# Patient Record
Sex: FEMALE | Race: WHITE | Employment: FULL TIME | URBAN - METROPOLITAN AREA
[De-identification: names, ages, dates, MRNs, and addresses within clinical notes are randomized per-mention and may not be internally consistent; named-entity substitution may affect disease eponyms.]

---

## 2017-01-19 ENCOUNTER — MYC MEDICAL ADVICE (OUTPATIENT)
Dept: FAMILY MEDICINE | Facility: CLINIC | Age: 27
End: 2017-01-19

## 2017-01-19 DIAGNOSIS — F41.8 SITUATIONAL ANXIETY: Primary | ICD-10-CM

## 2017-01-19 RX ORDER — HYDROXYZINE HYDROCHLORIDE 25 MG/1
12.5-25 TABLET, FILM COATED ORAL EVERY 6 HOURS PRN
Qty: 10 TABLET | Refills: 1 | Status: SHIPPED | OUTPATIENT
Start: 2017-01-19 | End: 2017-10-26

## 2017-01-19 NOTE — TELEPHONE ENCOUNTER
RN -- Please let pt know that I am sending hydroxyzine, which is a sedative that is used for anxiety. It is very sedating (all of the prn anxiety medications are). Often, we use ativan (lorazepam), however because that is a controlled substance, we do not generally prescribe this outside of a clinic visit. If in the future, if she has difficulty with flight anxiety and would like to discuss a different option, she can schedule a clinic visit. She should not drive after taking the hydroxyzine. She can take it 15-30 minutes before the flight. If she has not taken this before, I would recommend trying a half tablet first in case it makes her very sleepy. As an alternative, I would recommend loading a meditation maurice on her phone and consider using that with headphones before or at the beginning of the flight to help with some relaxation. Kim Erazo M.D.

## 2017-01-19 NOTE — TELEPHONE ENCOUNTER
Writer called patient and reviewed message per below from Dr. Erazo.    Patient verbalized understanding and in agreement with plan.  NIKKI BetancurN, RN

## 2017-01-19 NOTE — TELEPHONE ENCOUNTER
PAtient also called  States she is flying to [place] tomorrow and is generally OK, but will be flying alone this time and is asking if she can get something or some suggestions to help with flight anxiety for flights there and back.  Mercy Jackson RN

## 2017-01-19 NOTE — TELEPHONE ENCOUNTER
Routing to PCP.    Dr. Erazo-Please review.  Would Evisit be okay? Patient leaves tomorrow.    Thank you!  NIKKI BetancurN, RN

## 2017-10-20 DIAGNOSIS — Z30.9 ENCOUNTER FOR CONTRACEPTIVE MANAGEMENT: ICD-10-CM

## 2017-10-23 RX ORDER — NORETHINDRONE ACETATE AND ETHINYL ESTRADIOL 1.5; 3 MG/1; UG/1
TABLET ORAL
Qty: 28 TABLET | Refills: 0 | Status: SHIPPED | OUTPATIENT
Start: 2017-10-23 | End: 2017-10-26

## 2017-10-23 NOTE — TELEPHONE ENCOUNTER
Patient needs updated annual physical. Patient has scheduled physical on 10/26 at 3:40. Will fill medication for one month in the interim.    Thanks! Genevieve Loredo RN

## 2017-10-26 ENCOUNTER — OFFICE VISIT (OUTPATIENT)
Dept: FAMILY MEDICINE | Facility: CLINIC | Age: 27
End: 2017-10-26
Payer: COMMERCIAL

## 2017-10-26 VITALS
HEIGHT: 65 IN | BODY MASS INDEX: 28.32 KG/M2 | SYSTOLIC BLOOD PRESSURE: 123 MMHG | WEIGHT: 170 LBS | RESPIRATION RATE: 10 BRPM | TEMPERATURE: 97.9 F | HEART RATE: 57 BPM | DIASTOLIC BLOOD PRESSURE: 67 MMHG | OXYGEN SATURATION: 100 %

## 2017-10-26 DIAGNOSIS — F41.8 SITUATIONAL ANXIETY: ICD-10-CM

## 2017-10-26 DIAGNOSIS — Z00.00 ROUTINE GENERAL MEDICAL EXAMINATION AT A HEALTH CARE FACILITY: Primary | ICD-10-CM

## 2017-10-26 DIAGNOSIS — Z30.9 ENCOUNTER FOR CONTRACEPTIVE MANAGEMENT, UNSPECIFIED TYPE: ICD-10-CM

## 2017-10-26 PROCEDURE — 99395 PREV VISIT EST AGE 18-39: CPT | Performed by: FAMILY MEDICINE

## 2017-10-26 RX ORDER — HYDROXYZINE HYDROCHLORIDE 25 MG/1
12.5-25 TABLET, FILM COATED ORAL EVERY 6 HOURS PRN
Qty: 30 TABLET | Refills: 1 | Status: SHIPPED | OUTPATIENT
Start: 2017-10-26 | End: 2019-03-12

## 2017-10-26 RX ORDER — NORETHINDRONE ACETATE AND ETHINYL ESTRADIOL .03; 1.5 MG/1; MG/1
1 TABLET ORAL DAILY
Qty: 84 TABLET | Refills: 3 | Status: SHIPPED | OUTPATIENT
Start: 2017-10-26 | End: 2018-10-29

## 2017-10-26 NOTE — NURSING NOTE
"Chief Complaint   Patient presents with     Physical       Initial /67  Pulse 57  Temp 97.9  F (36.6  C) (Oral)  Resp 10  Ht 5' 5\" (1.651 m)  Wt 170 lb (77.1 kg)  LMP 08/22/2017 (Approximate)  SpO2 100%  BMI 28.29 kg/m2 Estimated body mass index is 28.29 kg/(m^2) as calculated from the following:    Height as of this encounter: 5' 5\" (1.651 m).    Weight as of this encounter: 170 lb (77.1 kg).  Medication Reconciliation: complete     Gaviota Lucas MA     "

## 2017-10-26 NOTE — MR AVS SNAPSHOT
After Visit Summary   10/26/2017    Jose Ramon Santizo    MRN: [id number]           Patient Information     Date Of Birth          1990        Visit Information        Provider Department      10/26/2017 3:40 PM Kmi Erazo MD [facility]        Today's Diagnoses     Routine general medical examination at a health care facility    -  1    Encounter for contraceptive management, unspecified type        Situational anxiety          Care Instructions    Send us the results of your biometric screening.    Preventive Health Recommendations  Female Ages 26 - 39  Yearly exam:   See your health care provider every year in order to    Review health changes.     Discuss preventive care.      Review your medicines if you your doctor has prescribed any.    Until age 30: Get a Pap test every three years (more often if you have had an abnormal result).    After age 30: Talk to your doctor about whether you should have a Pap test every 3 years or have a Pap test with HPV screening every 5 years.   You do not need a Pap test if your uterus was removed (hysterectomy) and you have not had cancer.  You should be tested each year for STDs (sexually transmitted diseases), if you're at risk.   Talk to your provider about how often to have your cholesterol checked.  If you are at risk for diabetes, you should have a diabetes test (fasting glucose).  Shots: Get a flu shot each year. Get a tetanus shot every 10 years.   Nutrition:     Eat at least 5 servings of fruits and vegetables each day.    Eat whole-grain bread, whole-wheat pasta and brown rice instead of white grains and rice.    Talk to your provider about Calcium and Vitamin D.     Lifestyle    Exercise at least 150 minutes a week (30 minutes a day, 5 days of the week). This will help you control your weight and prevent disease.    Limit alcohol to one drink per day.    No smoking.     Wear sunscreen to prevent skin cancer.    See your dentist  "every six months for an exam and cleaning.            Follow-ups after your visit        Who to contact     If you have questions or need follow up information about today's clinic visit or your schedule please contact [facility] directly at [phone].  Normal or non-critical lab and imaging results will be communicated to you by uTrail mehart, letter or phone within 4 business days after the clinic has received the results. If you do not hear from us within 7 days, please contact the clinic through uTrail mehart or phone. If you have a critical or abnormal lab result, we will notify you by phone as soon as possible.  Submit refill requests through DuraFizz or call your pharmacy and they will forward the refill request to us. Please allow 3 business days for your refill to be completed.          Additional Information About Your Visit        uTrail me[employer] Information     DuraFizz gives you secure access to your electronic health record. If you see a primary care provider, you can also send messages to your care team and make appointments. If you have questions, please call your primary care clinic.  If you do not have a primary care provider, please call [phone] and they will assist you.        Care EveryWhere ID     This is your Care EveryWhere ID. This could be used by other organizations to access your [place] medical records  [id number]        Your Vitals Were     Pulse Temperature Respirations Height Last Period Pulse Oximetry    57 97.9  F (36.6  C) (Oral) 10 5' 5\" (1.651 m) 08/22/2017 (Approximate) 100%    BMI (Body Mass Index)                   28.29 kg/m2            Blood Pressure from Last 3 Encounters:   10/26/17 123/67   10/17/16 102/60   07/09/15 126/70    Weight from Last 3 Encounters:   10/26/17 170 lb (77.1 kg)   10/17/16 163 lb (73.9 kg)   07/09/15 172 lb (78 kg)              Today, you had the following     No orders found for display         Today's Medication Changes          These changes " are accurate as of: 10/26/17  4:12 PM.  If you have any questions, ask your nurse or doctor.               These medicines have changed or have updated prescriptions.        Dose/Directions    norethindrone-ethinyl estradiol 1.5-30 MG-MCG per tablet   Commonly known as:  MICROGESTIN   This may have changed:  See the new instructions.   Used for:  Encounter for contraceptive management, unspecified type   Changed by:  Kim Erazo MD        Dose:  1 tablet   Take 1 tablet by mouth daily   Quantity:  84 tablet   Refills:  3            Where to get your medicines      These medications were sent to [id number] - [place], [place] - [address] AT [facility] [address]  [address], [place] [address]     Phone:  [phone]     hydrOXYzine 25 MG tablet    norethindrone-ethinyl estradiol 1.5-30 MG-MCG per tablet                Primary Care Provider Office Phone # Fax #    Kim Erazo [phone] [address] [facility] [address]        Equal Access to Services     [facility]: Hadii gregorio chua hadasho Soomaali, waaxda luqadaha, qaybta kaalmada adeegyajames, bairon lucas . So [facility] [phone].    ATENCIÓN: Si habla español, tiene a eckert disposición servicios gratuitos de asistencia lingüística. [facility] [phone].    We comply with applicable federal civil rights laws and [place] laws. We do not discriminate on the basis of race, color, national origin, age, disability, sex, sexual orientation, or gender identity.            Thank you!     Thank you for choosing [facility]  for your care. Our goal is always to provide you with excellent care. Hearing back from our patients is one way we can continue to improve our services. Please take a few minutes to complete the written survey that you may receive in the mail after your visit with us. Thank you!             Your Updated Medication List - Protect others around you: Learn  how to safely use, store and throw away your medicines at www.disposemymeds.org.          This list is accurate as of: 10/26/17  4:12 PM.  Always use your most recent med list.                   Brand Name Dispense Instructions for use Diagnosis    hydrOXYzine 25 MG tablet    ATARAX    30 tablet    Take 0.5-1 tablets (12.5-25 mg) by mouth every 6 hours as needed for itching or anxiety    Situational anxiety       norethindrone-ethinyl estradiol 1.5-30 MG-MCG per tablet    MICROGESTIN    84 tablet    Take 1 tablet by mouth daily    Encounter for contraceptive management, unspecified type

## 2017-10-26 NOTE — PATIENT INSTRUCTIONS
Send us the results of your biometric screening.    Preventive Health Recommendations  Female Ages 26 - 39  Yearly exam:   See your health care provider every year in order to    Review health changes.     Discuss preventive care.      Review your medicines if you your doctor has prescribed any.    Until age 30: Get a Pap test every three years (more often if you have had an abnormal result).    After age 30: Talk to your doctor about whether you should have a Pap test every 3 years or have a Pap test with HPV screening every 5 years.   You do not need a Pap test if your uterus was removed (hysterectomy) and you have not had cancer.  You should be tested each year for STDs (sexually transmitted diseases), if you're at risk.   Talk to your provider about how often to have your cholesterol checked.  If you are at risk for diabetes, you should have a diabetes test (fasting glucose).  Shots: Get a flu shot each year. Get a tetanus shot every 10 years.   Nutrition:     Eat at least 5 servings of fruits and vegetables each day.    Eat whole-grain bread, whole-wheat pasta and brown rice instead of white grains and rice.    Talk to your provider about Calcium and Vitamin D.     Lifestyle    Exercise at least 150 minutes a week (30 minutes a day, 5 days of the week). This will help you control your weight and prevent disease.    Limit alcohol to one drink per day.    No smoking.     Wear sunscreen to prevent skin cancer.    See your dentist every six months for an exam and cleaning.

## 2017-10-26 NOTE — PROGRESS NOTES
SUBJECTIVE:   CC: Jose Ramon Santizo is an 27 year old woman who presents for preventive health visit.     Physical   Annual:     Getting at least 3 servings of Calcium per day::  Yes    Bi-annual eye exam::  Yes    Dental care twice a year::  Yes    Sleep apnea or symptoms of sleep apnea::  None    Diet::  Regular (no restrictions)    Frequency of exercise::  4-5 days/week    Duration of exercise::  45-60 minutes    Taking medications regularly::  Yes    Medication side effects::  Not applicable    Additional concerns today::  No    Answers for HPI/ROS submitted by the patient on 10/23/2017   PHQ-2 Score: 0      She is tolerating microgestin and it is working well for her.    She would like a refill on her Atarax. She is using it infrequently in stressful situations.     Patient scheduled an appointment with cardiology but the clinic had to cancel as the cardiologist was out of town. She never rescheduled as she was not  concerned. She has not had another episode. She would like to continue to monitor. Occasionally she gets palpitations that last a few seconds and resolve with no accompanying symptoms.      She plans to get her flu shot at work.  She just had health screening done in August through the [employer].      Today's PHQ-2 Score:   PHQ-2 ( 1999 Pfizer) 10/23/2017   Q1: Little interest or pleasure in doing things 0   Q2: Feeling down, depressed or hopeless 0   PHQ-2 Score 0   Q1: Little interest or pleasure in doing things Not at all   Q2: Feeling down, depressed or hopeless Not at all   PHQ-2 Score 0     Abuse: Current or Past(Physical, Sexual or Emotional)- No  Do you feel safe in your environment - Yes    Social History   Substance Use Topics     Smoking status: Former Smoker     Packs/day: 0.50     Quit date: 9/26/2014     Smokeless tobacco: Never Used     Alcohol use Yes      Comment: 6 drinks a week      The patient does not drink >3 drinks per day nor >7 drinks per week.    Reviewed orders with patient.   Reviewed health maintenance and updated orders accordingly - Yes  BP Readings from Last 3 Encounters:   10/26/17 123/67   10/17/16 102/60   07/09/15 126/70    Wt Readings from Last 3 Encounters:   10/26/17 77.1 kg (170 lb)   10/17/16 73.9 kg (163 lb)   07/09/15 78 kg (172 lb)         Patient Active Problem List   Diagnosis     History of anesthesia reaction     CARDIOVASCULAR SCREENING; LDL GOAL LESS THAN 160     Past Surgical History:   Procedure Laterality Date     ENT SURGERY       ORTHOPEDIC SURGERY  3/2005    ACL repair       Social History   Substance Use Topics     Smoking status: Former Smoker     Packs/day: 0.50     Quit date: 9/26/2014     Smokeless tobacco: Never Used     Alcohol use Yes      Comment: 6 drinks a week      Family History   Problem Relation Age of Onset     Anesthesia Reaction Mother      Alcohol/Drug Other      Breast Cancer Other      CANCER Other      DIABETES Other      Thyroid Disease Other          Current Outpatient Prescriptions   Medication Sig Dispense Refill     norethindrone-ethinyl estradiol (MICROGESTIN) 1.5-30 MG-MCG per tablet Take 1 tablet by mouth daily 84 tablet 3     hydrOXYzine (ATARAX) 25 MG tablet Take 0.5-1 tablets (12.5-25 mg) by mouth every 6 hours as needed for itching or anxiety 30 tablet 1     [DISCONTINUED] MICROGESTIN 1.5-30 MG-MCG per tablet TAKE 1 TABLET BY MOUTH DAILY 28 tablet 0     Allergies   Allergen Reactions     Ciprofloxacin      Recent Labs   Lab Test  07/03/14   1238 07/02/13   LDL  98   --    HDL  60   --    TRIG  138   --    TSH   --   0.74      Mammogram not appropriate for this patient based on age.  Pertinent mammograms are reviewed under the imaging tab.  History of abnormal Pap smear:   NO - age 30- 65 PAP every 3 years recommended  Last 3 Pap Results:   PAP (no units)   Date Value   10/17/2016 NIL     Reviewed and updated as needed this visit by clinical staffTobacco  Allergies  Meds  Med Hx  Surg Hx  Fam Hx  Soc Hx      Reviewed and  "updated as needed this visit by Provider        Past Medical History:   Diagnosis Date     History of anesthesia reaction       Past Surgical History:   Procedure Laterality Date     ENT SURGERY       ORTHOPEDIC SURGERY  3/2005    ACL repair     Obstetric History       T0      L0     SAB0   TAB0   Ectopic0   Multiple0   Live Births0           Review of Systems   ROS: 10 point ROS neg other than the symptoms noted above in the HPI.    This document serves as a record of the services and decisions personally performed and made by Kim Erazo MD. It was created on his/her behalf by Katarina Ross, trained medical scribe. The creation of this document is based the provider's statements to the medical scribes.    Scribe Katarina Ross, 2017   OBJECTIVE:   /67  Pulse 57  Temp 97.9  F (36.6  C) (Oral)  Resp 10  Ht 1.651 m (5' 5\")  Wt 77.1 kg (170 lb)  LMP 2017 (Approximate)  SpO2 100%  BMI 28.29 kg/m2  Physical Exam  GENERAL: healthy, alert and no distress  EYES: Eyes grossly normal to inspection, PERRL and conjunctivae and sclerae normal  HENT: ear canals and TM's normal, nose and mouth without ulcers or lesions  NECK: no adenopathy, no asymmetry, masses, or scars and thyroid normal to palpation  RESP: lungs clear to auscultation - no rales, rhonchi or wheezes  CV: regular rate and rhythm, normal S1 S2, no S3 or S4, no murmur, click or rub, no peripheral edema and peripheral pulses strong  ABDOMEN: soft, nontender, no hepatosplenomegaly, no masses and bowel sounds normal  MS: no gross musculoskeletal defects noted, no edema  SKIN: no suspicious lesions or rashes  NEURO: Normal strength and tone, mentation intact and speech normal  PSYCH: mentation appears normal, affect normal/bright  ASSESSMENT/PLAN:   1. Routine general medical examination at a health care facility  PAP due . She had biometric screening done through work in August with lipids and glucose. Health " "maintenance utd    2. Encounter for contraceptive management, unspecified type    - norethindrone-ethinyl estradiol (MICROGESTIN) 1.5-30 MG-MCG per tablet; Take 1 tablet by mouth daily  Dispense: 84 tablet; Refill: 3    3. Situational anxiety  Stable. She continues to use Atarax infrequently as needed.  - hydrOXYzine (ATARAX) 25 MG tablet; Take 0.5-1 tablets (12.5-25 mg) by mouth every 6 hours as needed for itching or anxiety  Dispense: 30 tablet; Refill: 1      COUNSELING:  Reviewed preventive health counseling, as reflected in patient instructions  BP Screening:   Last 3 BP Readings:    BP Readings from Last 3 Encounters:   10/26/17 123/67   10/17/16 102/60   07/09/15 126/70   The following was recommended to the patient:  Re-screen BP within a year and recommended lifestyle modifications   reports that she quit smoking about 3 years ago. She smoked 0.50 packs per day. She has never used smokeless tobacco.  Estimated body mass index is 28.29 kg/(m^2) as calculated from the following:    Height as of this encounter: 1.651 m (5' 5\").    Weight as of this encounter: 77.1 kg (170 lb).   Weight management plan: Discussed healthy diet and exercise guidelines and patient will follow up in 12 months in clinic to re-evaluate.    Counseling Resources:  ATP IV Guidelines  Pooled Cohorts Equation Calculator  Breast Cancer Risk Calculator  FRAX Risk Assessment  ICSI Preventive Guidelines  Dietary Guidelines for Americans, 2010  USDA's MyPlate  ASA Prophylaxis  Lung CA Screening    The information in this document, created by the medical scribe for me, accurately reflects the services I personally performed and the decisions made by me. I have reviewed and approved this document for accuracy. 10/26/17    Kim Erazo MD  [facility]      "

## 2017-12-21 ENCOUNTER — MYC MEDICAL ADVICE (OUTPATIENT)
Dept: FAMILY MEDICINE | Facility: CLINIC | Age: 27
End: 2017-12-21

## 2018-10-26 ASSESSMENT — ENCOUNTER SYMPTOMS
JOINT SWELLING: 0
ABDOMINAL PAIN: 0
SORE THROAT: 0
DIZZINESS: 0
CONSTIPATION: 0
WEAKNESS: 0
ARTHRALGIAS: 0
NAUSEA: 0
HEMATOCHEZIA: 0
DYSURIA: 0
SHORTNESS OF BREATH: 0
FREQUENCY: 0
HEARTBURN: 0
HEMATURIA: 0
EYE PAIN: 0
PALPITATIONS: 0
PARESTHESIAS: 0
NERVOUS/ANXIOUS: 0
DIARRHEA: 0
BREAST MASS: 0
FEVER: 0
HEADACHES: 0
CHILLS: 0
MYALGIAS: 0
COUGH: 0

## 2018-10-29 ENCOUNTER — OFFICE VISIT (OUTPATIENT)
Dept: FAMILY MEDICINE | Facility: CLINIC | Age: 28
End: 2018-10-29
Payer: COMMERCIAL

## 2018-10-29 VITALS
SYSTOLIC BLOOD PRESSURE: 118 MMHG | BODY MASS INDEX: 27.2 KG/M2 | HEART RATE: 58 BPM | DIASTOLIC BLOOD PRESSURE: 68 MMHG | OXYGEN SATURATION: 97 % | WEIGHT: 163.25 LBS | HEIGHT: 65 IN

## 2018-10-29 DIAGNOSIS — Z13.1 SCREENING FOR DIABETES MELLITUS: ICD-10-CM

## 2018-10-29 DIAGNOSIS — L30.9 ECZEMA, UNSPECIFIED TYPE: ICD-10-CM

## 2018-10-29 DIAGNOSIS — Z11.4 SCREENING FOR HUMAN IMMUNODEFICIENCY VIRUS: ICD-10-CM

## 2018-10-29 DIAGNOSIS — Z30.9 ENCOUNTER FOR CONTRACEPTIVE MANAGEMENT, UNSPECIFIED TYPE: ICD-10-CM

## 2018-10-29 DIAGNOSIS — Z00.00 ENCOUNTER FOR ROUTINE ADULT HEALTH EXAMINATION WITHOUT ABNORMAL FINDINGS: Primary | ICD-10-CM

## 2018-10-29 DIAGNOSIS — Z13.6 CARDIOVASCULAR SCREENING; LDL GOAL LESS THAN 160: ICD-10-CM

## 2018-10-29 LAB
CHOLEST SERPL-MCNC: 185 MG/DL
GLUCOSE SERPL-MCNC: 83 MG/DL (ref 70–99)
HDLC SERPL-MCNC: 70 MG/DL
LDLC SERPL CALC-MCNC: 98 MG/DL
NONHDLC SERPL-MCNC: 115 MG/DL
TRIGL SERPL-MCNC: 87 MG/DL

## 2018-10-29 PROCEDURE — 36415 COLL VENOUS BLD VENIPUNCTURE: CPT | Performed by: FAMILY MEDICINE

## 2018-10-29 PROCEDURE — 99395 PREV VISIT EST AGE 18-39: CPT | Performed by: FAMILY MEDICINE

## 2018-10-29 PROCEDURE — 87389 HIV-1 AG W/HIV-1&-2 AB AG IA: CPT | Performed by: FAMILY MEDICINE

## 2018-10-29 PROCEDURE — 82947 ASSAY GLUCOSE BLOOD QUANT: CPT | Performed by: FAMILY MEDICINE

## 2018-10-29 PROCEDURE — 80061 LIPID PANEL: CPT | Performed by: FAMILY MEDICINE

## 2018-10-29 RX ORDER — TRIAMCINOLONE ACETONIDE 1 MG/G
CREAM TOPICAL
Qty: 30 G | Refills: 0 | Status: SHIPPED | OUTPATIENT
Start: 2018-10-29 | End: 2020-02-25

## 2018-10-29 RX ORDER — NORETHINDRONE ACETATE AND ETHINYL ESTRADIOL .03; 1.5 MG/1; MG/1
1 TABLET ORAL DAILY
Qty: 84 TABLET | Refills: 3 | Status: SHIPPED | OUTPATIENT
Start: 2018-10-29 | End: 2018-10-29

## 2018-10-29 RX ORDER — NORETHINDRONE ACETATE AND ETHINYL ESTRADIOL .03; 1.5 MG/1; MG/1
1 TABLET ORAL DAILY
Qty: 84 TABLET | Refills: 3 | Status: SHIPPED | OUTPATIENT
Start: 2018-10-29 | End: 2019-06-17

## 2018-10-29 RX ORDER — TRIAMCINOLONE ACETONIDE 1 MG/G
CREAM TOPICAL
Qty: 30 G | Refills: 0 | Status: SHIPPED | OUTPATIENT
Start: 2018-10-29 | End: 2018-10-29

## 2018-10-29 ASSESSMENT — ENCOUNTER SYMPTOMS
NERVOUS/ANXIOUS: 0
DIARRHEA: 0
ARTHRALGIAS: 0
PALPITATIONS: 0
CONSTIPATION: 0
HEMATURIA: 0
FEVER: 0
NAUSEA: 0
SORE THROAT: 0
ABDOMINAL PAIN: 0
CHILLS: 0
HEARTBURN: 0
BREAST MASS: 0
PARESTHESIAS: 0
WEAKNESS: 0
COUGH: 0
FREQUENCY: 0
HEADACHES: 0
SHORTNESS OF BREATH: 0
DYSURIA: 0
EYE PAIN: 0
DIZZINESS: 0
JOINT SWELLING: 0
MYALGIAS: 0
HEMATOCHEZIA: 0

## 2018-10-29 NOTE — PATIENT INSTRUCTIONS
1) Get your flu shot at work..     Preventive Health Recommendations  Female Ages 26 - 39  Yearly exam:   See your health care provider every year in order to    Review health changes.     Discuss preventive care.      Review your medicines if you your doctor has prescribed any.    Until age 30: Get a Pap test every three years (more often if you have had an abnormal result).    After age 30: Talk to your doctor about whether you should have a Pap test every 3 years or have a Pap test with HPV screening every 5 years.   You do not need a Pap test if your uterus was removed (hysterectomy) and you have not had cancer.  You should be tested each year for STDs (sexually transmitted diseases), if you're at risk.   Talk to your provider about how often to have your cholesterol checked.  If you are at risk for diabetes, you should have a diabetes test (fasting glucose).  Shots: Get a flu shot each year. Get a tetanus shot every 10 years.   Nutrition:     Eat at least 5 servings of fruits and vegetables each day.    Eat whole-grain bread, whole-wheat pasta and brown rice instead of white grains and rice.    Get adequate Calcium and Vitamin D.     Lifestyle    Exercise at least 150 minutes a week (30 minutes a day, 5 days of the week). This will help you control your weight and prevent disease.    Limit alcohol to one drink per day.    No smoking.     Wear sunscreen to prevent skin cancer.    See your dentist every six months for an exam and cleaning.

## 2018-10-29 NOTE — MR AVS SNAPSHOT
After Visit Summary   10/29/2018    Jose Ramon Santizo    MRN: [id number]           Patient Information     Date Of Birth          1990        Visit Information        Provider Department      10/29/2018 10:00 AM Kim Erazo MD [facility]        Today's Diagnoses     Encounter for routine adult health examination without abnormal findings    -  1    Encounter for contraceptive management, unspecified type        Eczema, unspecified type        Screening for human immunodeficiency virus          Care Instructions    1) Get your flu shot at work..     Preventive Health Recommendations  Female Ages 26 - 39  Yearly exam:   See your health care provider every year in order to    Review health changes.     Discuss preventive care.      Review your medicines if you your doctor has prescribed any.    Until age 30: Get a Pap test every three years (more often if you have had an abnormal result).    After age 30: Talk to your doctor about whether you should have a Pap test every 3 years or have a Pap test with HPV screening every 5 years.   You do not need a Pap test if your uterus was removed (hysterectomy) and you have not had cancer.  You should be tested each year for STDs (sexually transmitted diseases), if you're at risk.   Talk to your provider about how often to have your cholesterol checked.  If you are at risk for diabetes, you should have a diabetes test (fasting glucose).  Shots: Get a flu shot each year. Get a tetanus shot every 10 years.   Nutrition:     Eat at least 5 servings of fruits and vegetables each day.    Eat whole-grain bread, whole-wheat pasta and brown rice instead of white grains and rice.    Get adequate Calcium and Vitamin D.     Lifestyle    Exercise at least 150 minutes a week (30 minutes a day, 5 days of the week). This will help you control your weight and prevent disease.    Limit alcohol to one drink per day.    No smoking.     Wear sunscreen to prevent  "skin cancer.    See your dentist every six months for an exam and cleaning.            Follow-ups after your visit        Who to contact     If you have questions or need follow up information about today's clinic visit or your schedule please contact [facility] directly at [phone].  Normal or non-critical lab and imaging results will be communicated to you by MyChart, letter or phone within 4 business days after the clinic has received the results. If you do not hear from us within 7 days, please contact the clinic through [employer] or phone. If you have a critical or abnormal lab result, we will notify you by phone as soon as possible.  Submit refill requests through [employer] or call your pharmacy and they will forward the refill request to us. Please allow 3 business days for your refill to be completed.          Additional Information About Your Visit        MyChart Information     [employer] gives you secure access to your electronic health record. If you see a primary care provider, you can also send messages to your care team and make appointments. If you have questions, please call your primary care clinic.  If you do not have a primary care provider, please call [phone] and they will assist you.        Care EveryWhere ID     This is your Care EveryWhere ID. This could be used by other organizations to access your [place] medical records  [id number]        Your Vitals Were     Pulse Height Last Period Pulse Oximetry BMI (Body Mass Index)       58 5' 5\" (1.651 m) 10/09/2018 (Exact Date) 97% 27.17 kg/m2        Blood Pressure from Last 3 Encounters:   10/29/18 118/68   10/26/17 123/67   10/17/16 102/60    Weight from Last 3 Encounters:   10/29/18 163 lb 4 oz (74 kg)   10/26/17 170 lb (77.1 kg)   10/17/16 163 lb (73.9 kg)              We Performed the Following     HIV Antigen Antibody Combo          Today's Medication Changes          These changes are accurate as of 10/29/18 10:42 AM.  If " you have any questions, ask your nurse or doctor.               Start taking these medicines.        Dose/Directions    norethindrone-ethinyl estradiol 1.5-30 MG-MCG per tablet   Commonly known as:  MICROGESTIN   Used for:  Encounter for contraceptive management, unspecified type   Started by:  Kim Erazo MD        Dose:  1 tablet   Take 1 tablet by mouth daily   Quantity:  84 tablet   Refills:  3       triamcinolone 0.1 % cream   Commonly known as:  KENALOG   Used for:  Eczema, unspecified type   Started by:  Kim Erazo MD        Apply sparingly to affected area three times daily for 14 days.   Quantity:  30 g   Refills:  0            Where to get your medicines      These medications were sent to [address] [address]     Phone:  [phone]     norethindrone-ethinyl estradiol 1.5-30 MG-MCG per tablet    triamcinolone 0.1 % cream                Primary Care Provider Office Phone # Fax #    Kim Erazo [phone]       [address]  [facility] [address]        Equal Access to Services     [facility] : Hadii gregorio knox Soelian, waaxda lusravanthi, qaybta kaalmajames sousa, bairon lucas . So [facility] [phone].    ATENCIÓN: Si habla español, tiene a eckert disposición servicios gratuitos de asistencia lingüística. [facility] [phone].    We comply with applicable federal civil rights laws and [place] laws. We do not discriminate on the basis of race, color, national origin, age, disability, sex, sexual orientation, or gender identity.            Thank you!     Thank you for choosing [facility]  for your care. Our goal is always to provide you with excellent care. Hearing back from our patients is one way we can continue to improve our services. Please take a few minutes to complete the written survey that you may receive in the mail after your visit  with us. Thank you!             Your Updated Medication List - Protect others around you: Learn how to safely use, store and throw away your medicines at www.disposemymeds.org.          This list is accurate as of 10/29/18 10:42 AM.  Always use your most recent med list.                   Brand Name Dispense Instructions for use Diagnosis    hydrOXYzine 25 MG tablet    ATARAX    30 tablet    Take 0.5-1 tablets (12.5-25 mg) by mouth every 6 hours as needed for itching or anxiety    Situational anxiety       norethindrone-ethinyl estradiol 1.5-30 MG-MCG per tablet    MICROGESTIN    84 tablet    Take 1 tablet by mouth daily    Encounter for contraceptive management, unspecified type       triamcinolone 0.1 % cream    KENALOG    30 g    Apply sparingly to affected area three times daily for 14 days.    Eczema, unspecified type

## 2018-10-29 NOTE — PROGRESS NOTES
SUBJECTIVE:   CC: Jose Ramon Santizo is an 28 year old woman who presents for preventive health visit.     Physical   Annual:     Getting at least 3 servings of Calcium per day:  Yes    Bi-annual eye exam:  Yes    Dental care twice a year:  Yes    Sleep apnea or symptoms of sleep apnea:  None    Diet:  Carbohydrate counting and Breakfast skipped    Frequency of exercise:  4-5 days/week    Duration of exercise:  Greater than 60 minutes    Taking medications regularly:  Yes    Medication side effects:  Not applicable    Additional concerns today:  No      Today's PHQ-2 Score:   PHQ-2 ( 1999 [employer]) 10/26/2018   Q1: Little interest or pleasure in doing things 0   Q2: Feeling down, depressed or hopeless 0   PHQ-2 Score 0   Q1: Little interest or pleasure in doing things Not at all   Q2: Feeling down, depressed or hopeless Not at all   PHQ-2 Score 0       Abuse: Current or Past(Physical, Sexual or Emotional)- No  Do you feel safe in your environment - Yes    Social History   Substance Use Topics     Smoking status: Former Smoker     Packs/day: 0.50     Types: Cigarettes     Start date: 1/1/2010     Quit date: 9/26/2014     Smokeless tobacco: Never Used     Alcohol use Yes      Comment: 6 drinks a week      Alcohol Use 10/26/2018   If you drink alcohol do you typically have greater than 3 drinks per day OR greater than 7 drinks per week? No   No flowsheet data found.    Reviewed orders with patient.  Reviewed health maintenance and updated orders accordingly - Yes  BP Readings from Last 3 Encounters:   10/29/18 118/68   10/26/17 123/67   10/17/16 102/60    Wt Readings from Last 3 Encounters:   10/29/18 163 lb 4 oz (74 kg)   10/26/17 170 lb (77.1 kg)   10/17/16 163 lb (73.9 kg)                  Patient Active Problem List   Diagnosis     History of anesthesia reaction     CARDIOVASCULAR SCREENING; LDL GOAL LESS THAN 160     Past Surgical History:   Procedure Laterality Date     ENT SURGERY       EYE SURGERY  January 2017     LASIK     ORTHOPEDIC SURGERY  3/2005    ACL repair       Social History   Substance Use Topics     Smoking status: Former Smoker     Packs/day: 0.50     Types: Cigarettes     Start date: 1/1/2010     Quit date: 9/26/2014     Smokeless tobacco: Never Used     Alcohol use Yes      Comment: 6 drinks a week      Family History   Problem Relation Age of Onset     Anesthesia Reaction Mother      Hypertension Mother      Hyperlipidemia Mother      Anxiety Disorder Mother      Alcohol/Drug Other      Breast Cancer Other      Cancer Other      Diabetes Other      Thyroid Disease Other      Hypertension Maternal Grandmother      Hyperlipidemia Maternal Grandmother      Other Cancer Maternal Grandmother      Ear cancer     Hypertension Maternal Grandfather      Hyperlipidemia Maternal Grandfather      Hypertension Paternal Grandfather      Other Cancer Paternal Grandmother      Ovarian cancer         Current Outpatient Prescriptions   Medication Sig Dispense Refill     hydrOXYzine (ATARAX) 25 MG tablet Take 0.5-1 tablets (12.5-25 mg) by mouth every 6 hours as needed for itching or anxiety 30 tablet 1     norethindrone-ethinyl estradiol (MICROGESTIN) 1.5-30 MG-MCG per tablet Take 1 tablet by mouth daily 84 tablet 3     Allergies   Allergen Reactions     Ciprofloxacin      Recent Labs   Lab Test  07/03/14   1238 07/02/13   LDL  98   --    HDL  60   --    TRIG  138   --    TSH   --   0.74        Mammogram not appropriate for this patient based on age.    Pertinent mammograms are reviewed under the imaging tab.  History of abnormal Pap smear:   NO - age 30- 65 PAP every 3 years recommended  Last 3 Pap Results:   PAP (no units)   Date Value   10/17/2016 NIL     PAP / HPV 10/17/2016   PAP NIL     Reviewed and updated as needed this visit by clinical staff  Tobacco  Allergies  Meds         Reviewed and updated as needed this visit by Provider        Past Medical History:   Diagnosis Date     History of anesthesia reaction      "  Past Surgical History:   Procedure Laterality Date     ENT SURGERY       EYE SURGERY  2017    [facility]     ORTHOPEDIC SURGERY  3/2005    ACL repair     Obstetric History       T0      L0     SAB0   TAB0   Ectopic0   Multiple0   Live Births0           Review of Systems   Constitutional: Negative for chills and fever.   HENT: Negative for congestion, ear pain, hearing loss and sore throat.    Eyes: Negative for pain and visual disturbance.   Respiratory: Negative for cough and shortness of breath.    Cardiovascular: Negative for chest pain, palpitations and peripheral edema.   Gastrointestinal: Negative for abdominal pain, constipation, diarrhea, heartburn, hematochezia and nausea.   Breasts:  Negative for tenderness, breast mass and discharge.   Genitourinary: Negative for dysuria, frequency, genital sores, hematuria, pelvic pain, urgency, vaginal bleeding and vaginal discharge.   Musculoskeletal: Negative for arthralgias, joint swelling and myalgias.   Skin: Positive for rash.   Neurological: Negative for dizziness, weakness, headaches and paresthesias.   Psychiatric/Behavioral: Negative for mood changes. The patient is not nervous/anxious.       she gets eczema on her hands in the winter. She has been using cream that she had years ago.      OBJECTIVE:   /68  Pulse 58  Ht 5' 5\" (1.651 m)  Wt 163 lb 4 oz (74 kg)  LMP 10/09/2018 (Exact Date)  SpO2 97%  BMI 27.17 kg/m2   Wt Readings from Last 5 Encounters:   10/29/18 163 lb 4 oz (74 kg)   10/26/17 170 lb (77.1 kg)   10/17/16 163 lb (73.9 kg)   07/09/15 172 lb (78 kg)   14 170 lb (77.1 kg)      Physical Exam  GENERAL: healthy, alert and no distress  EYES: Eyes grossly normal to inspection, PERRL and conjunctivae and sclerae normal  HENT: ear canals and TM's normal, nose and mouth without ulcers or lesions  NECK: no adenopathy, no asymmetry, masses, or scars and thyroid normal to palpation  RESP: lungs clear to auscultation - no " "rales, rhonchi or wheezes  BREAST: normal without masses, tenderness or nipple discharge and no palpable axillary masses or adenopathy  CV: regular rate and rhythm, normal S1 S2, no S3 or S4, no murmur, click or rub, no peripheral edema and peripheral pulses strong  ABDOMEN: soft, nontender, no hepatosplenomegaly, no masses and bowel sounds normal  MS: no gross musculoskeletal defects noted, no edema  SKIN: no suspicious lesions or rashes  NEURO: Normal strength and tone, mentation intact and speech normal  PSYCH: mentation appears normal, affect normal/bright    Diagnostic Test Results:  none     ASSESSMENT/PLAN:   1. Encounter for routine adult health examination without abnormal findings       2. Encounter for contraceptive management, unspecified type     - norethindrone-ethinyl estradiol (MICROGESTIN) 1.5-30 MG-MCG per tablet; Take 1 tablet by mouth daily  Dispense: 84 tablet; Refill: 3    3. Eczema, unspecified type   in the gomez.  Had steroid cream from her high school years, but has run out of medication.  She continues to moisturize well especially during the wintertime.  - triamcinolone (KENALOG) 0.1 % cream; Apply sparingly to affected area three times daily for 14 days.  Dispense: 30 g; Refill: 0    COUNSELING:  Reviewed preventive health counseling, as reflected in patient instructions  She denies any concern about STI's.  BP Readings from Last 1 Encounters:   10/29/18 118/68     Estimated body mass index is 27.17 kg/(m^2) as calculated from the following:    Height as of this encounter: 5' 5\" (1.651 m).    Weight as of this encounter: 163 lb 4 oz (74 kg).      Weight management plan: Diet and exercise  She has been working on weight loss doing a type of fast diets.  She is only eating during an 8-hour period each day.  She has also increased her exercise level.  She has been doing some running and taking classes.   reports that she quit smoking about 4 years ago. Her smoking use included Cigarettes. " She started smoking about 8 years ago. She smoked 0.50 packs per day. She has never used smokeless tobacco.      Counseling Resources:  ATP IV Guidelines  Pooled Cohorts Equation Calculator  Breast Cancer Risk Calculator  FRAX Risk Assessment  ICSI Preventive Guidelines  Dietary Guidelines for Americans, 2010  [employer]'s MyPlate  ASA Prophylaxis  Lung CA Screening    Kim Erazo MD   [facility] [place]  Answers for HPI/ROS submitted by the patient on 10/26/2018   PHQ-2 Score: 0

## 2018-10-30 LAB — HIV 1+2 AB+HIV1 P24 AG SERPL QL IA: NONREACTIVE

## 2019-03-11 ENCOUNTER — TELEPHONE (OUTPATIENT)
Dept: FAMILY MEDICINE | Facility: CLINIC | Age: 29
End: 2019-03-11

## 2019-03-11 DIAGNOSIS — F41.8 SITUATIONAL ANXIETY: ICD-10-CM

## 2019-03-11 NOTE — TELEPHONE ENCOUNTER
Reason for Call:  Medication or medication refill:    Do you use a [facility]?  Name of the pharmacy and phone number for the current request:  [facility]  [phone]      medication requested: hydrOXYzine (ATARAX) 25 MG tablet    Other request:     Can we leave a detailed message on this number? YES    Phone number patient can be reached at: Home number on file [phone] (home)    Best Time: Any Time    Call taken on 3/11/2019 at 5:21 PM by Jojo Renteria

## 2019-03-12 RX ORDER — HYDROXYZINE HYDROCHLORIDE 25 MG/1
12.5-25 TABLET, FILM COATED ORAL EVERY 6 HOURS PRN
Qty: 30 TABLET | Refills: 1 | Status: SHIPPED | OUTPATIENT
Start: 2019-03-12 | End: 2020-04-27

## 2019-03-12 NOTE — TELEPHONE ENCOUNTER
Dr. Erazo-Please sign if agree.  Protocol is not going through for this medication.    Thank you!  JUDD Hawkins, RN      Last office visit: 10/29/18    Requested Prescriptions   Pending Prescriptions Disp Refills     hydrOXYzine (ATARAX) 25 MG tablet 30 tablet 1     Sig: Take 0.5-1 tablets (12.5-25 mg) by mouth every 6 hours as needed for itching or anxiety    There is no refill protocol information for this order

## 2019-05-18 ENCOUNTER — TRANSFERRED RECORDS (OUTPATIENT)
Dept: HEALTH INFORMATION MANAGEMENT | Facility: CLINIC | Age: 29
End: 2019-05-18

## 2019-06-17 DIAGNOSIS — Z30.9 ENCOUNTER FOR CONTRACEPTIVE MANAGEMENT, UNSPECIFIED TYPE: ICD-10-CM

## 2019-06-17 NOTE — TELEPHONE ENCOUNTER
"Requested Prescriptions   Pending Prescriptions Disp Refills     MICROGESTIN 1.5-30 MG-MCG tablet [Pharmacy Med Name: MICROGESTIN 1.5/30 TAB 21] 84 tablet 0     Sig: TAKE 1 TABLET BY MOUTH EVERY DAY  Last Written Prescription Date:  10/29/2018  Last Fill Quantity: 84 tablet,  # refills: 3   Last Office Visit: 10/29/2018   Future Office Visit:            Contraceptives Protocol Passed - 6/17/2019 12:38 PM        Passed - Patient is not a current smoker if age is 35 or older        Passed - Recent (12 mo) or future (30 days) visit within the authorizing provider's specialty     Patient had office visit in the last 12 months or has a visit in the next 30 days with authorizing provider or within the authorizing provider's specialty.  See \"Patient Info\" tab in inbasket, or \"Choose Columns\" in Meds & Orders section of the refill encounter.            Passed - Medication is active on med list        Passed - No active pregnancy on record        Passed - No positive pregnancy test in past 12 months          "

## 2019-06-18 RX ORDER — NORETHINDRONE ACETATE AND ETHINYL ESTRADIOL 1.5; 3 MG/1; UG/1
TABLET ORAL
Qty: 84 TABLET | Refills: 0 | Status: SHIPPED | OUTPATIENT
Start: 2019-06-18 | End: 2020-02-25

## 2019-11-08 ENCOUNTER — HEALTH MAINTENANCE LETTER (OUTPATIENT)
Age: 29
End: 2019-11-08

## 2020-02-03 ENCOUNTER — ANCILLARY PROCEDURE (OUTPATIENT)
Dept: GENERAL RADIOLOGY | Facility: CLINIC | Age: 30
End: 2020-02-03
Attending: PHYSICIAN ASSISTANT
Payer: COMMERCIAL

## 2020-02-03 ENCOUNTER — OFFICE VISIT (OUTPATIENT)
Dept: URGENT CARE | Facility: URGENT CARE | Age: 30
End: 2020-02-03
Payer: COMMERCIAL

## 2020-02-03 VITALS
TEMPERATURE: 98 F | HEART RATE: 65 BPM | DIASTOLIC BLOOD PRESSURE: 62 MMHG | RESPIRATION RATE: 16 BRPM | OXYGEN SATURATION: 97 % | SYSTOLIC BLOOD PRESSURE: 112 MMHG | BODY MASS INDEX: 26.68 KG/M2 | HEIGHT: 66 IN | WEIGHT: 166 LBS

## 2020-02-03 DIAGNOSIS — R05.9 COUGH: ICD-10-CM

## 2020-02-03 DIAGNOSIS — R06.2 WHEEZING: ICD-10-CM

## 2020-02-03 DIAGNOSIS — R07.89 CHEST TIGHTNESS: ICD-10-CM

## 2020-02-03 DIAGNOSIS — R05.8 PRODUCTIVE COUGH: ICD-10-CM

## 2020-02-03 DIAGNOSIS — R09.89 CHEST CONGESTION: ICD-10-CM

## 2020-02-03 DIAGNOSIS — R68.83 CHILLS: Primary | ICD-10-CM

## 2020-02-03 DIAGNOSIS — R68.83 CHILLS: ICD-10-CM

## 2020-02-03 LAB
FLUAV+FLUBV AG SPEC QL: NEGATIVE
FLUAV+FLUBV AG SPEC QL: NEGATIVE
SPECIMEN SOURCE: NORMAL

## 2020-02-03 PROCEDURE — 71046 X-RAY EXAM CHEST 2 VIEWS: CPT

## 2020-02-03 PROCEDURE — 99214 OFFICE O/P EST MOD 30 MIN: CPT | Mod: 25 | Performed by: PHYSICIAN ASSISTANT

## 2020-02-03 PROCEDURE — 94640 AIRWAY INHALATION TREATMENT: CPT | Performed by: PHYSICIAN ASSISTANT

## 2020-02-03 PROCEDURE — 87804 INFLUENZA ASSAY W/OPTIC: CPT | Mod: 59 | Performed by: PHYSICIAN ASSISTANT

## 2020-02-03 RX ORDER — BENZONATATE 200 MG/1
200 CAPSULE ORAL 3 TIMES DAILY PRN
Qty: 21 CAPSULE | Refills: 0 | Status: SHIPPED | OUTPATIENT
Start: 2020-02-03 | End: 2020-02-25

## 2020-02-03 RX ORDER — AZITHROMYCIN 250 MG/1
TABLET, FILM COATED ORAL
Qty: 6 TABLET | Refills: 0 | Status: SHIPPED | OUTPATIENT
Start: 2020-02-03 | End: 2020-02-25

## 2020-02-03 RX ORDER — ALBUTEROL SULFATE 0.83 MG/ML
2.5 SOLUTION RESPIRATORY (INHALATION) ONCE
Status: COMPLETED | OUTPATIENT
Start: 2020-02-03 | End: 2020-02-03

## 2020-02-03 RX ORDER — ALBUTEROL SULFATE 90 UG/1
2 AEROSOL, METERED RESPIRATORY (INHALATION) EVERY 6 HOURS
Qty: 1 INHALER | Refills: 0 | Status: SHIPPED | OUTPATIENT
Start: 2020-02-03 | End: 2020-04-27

## 2020-02-03 RX ADMIN — ALBUTEROL SULFATE 2.5 MG: 0.83 SOLUTION RESPIRATORY (INHALATION) at 12:21

## 2020-02-03 ASSESSMENT — PAIN SCALES - GENERAL: PAINLEVEL: MILD PAIN (3)

## 2020-02-03 ASSESSMENT — MIFFLIN-ST. JEOR: SCORE: 1481.78

## 2020-02-03 NOTE — PROGRESS NOTES
SUBJECTIVE:   Jose Ramon Santizo is a 29 year old female presenting with a chief complaint of chest congestion, productive cough and purulent nasal drainage.  Onset of symptoms was 3 day(s) ago.  Course of illness is worsening.    Severity moderate  Current and Associated symptoms: chest congestion, coughing  Treatment measures tried include OTC Cough med.  Predisposing factors include recent illness .    Past Medical History:   Diagnosis Date     History of anesthesia reaction         Allergies   Allergen Reactions     Ciprofloxacin      Family History   Problem Relation Age of Onset     Anesthesia Reaction Mother      Hypertension Mother      Hyperlipidemia Mother      Anxiety Disorder Mother      Alcohol/Drug Other      Breast Cancer Other      Cancer Other      Diabetes Other      Thyroid Disease Other      Hypertension Maternal Grandmother      Hyperlipidemia Maternal Grandmother      Other Cancer Maternal Grandmother         Ear cancer     Hypertension Maternal Grandfather      Hyperlipidemia Maternal Grandfather      Hypertension Paternal Grandfather      Other Cancer Paternal Grandmother         Ovarian cancer       Social History     Tobacco Use     Smoking status: Former Smoker     Packs/day: 0.50     Types: Cigarettes     Start date: 2010     Last attempt to quit: 2014     Years since quittin.3     Smokeless tobacco: Never Used   Substance Use Topics     Alcohol use: Yes     Comment: 6 drinks a week        ROS:  CONSTITUTIONAL:POSITIVE  for chills  INTEGUMENTARY/SKIN: NEGATIVE for worrisome rashes, moles or lesions  EYES: NEGATIVE for vision changes or irritation  ENT/MOUTH: POSITIVE for nasal congestion, drainage  RESP:POSITIVE for cough-productive  CV: NEGATIVE for chest pain, palpitations or peripheral edema  GI: NEGATIVE for nausea, abdominal pain, heartburn, or change in bowel habits  : normal menstrual cycles  MUSCULOSKELETAL: NEGATIVE for significant arthralgias or myalgia  NEURO:  "NEGATIVE for weakness, dizziness or paresthesias    OBJECTIVE  :/62 (BP Location: Right arm, Patient Position: Sitting, Cuff Size: Adult Regular)   Pulse 65   Temp 98  F (36.7  C) (Oral)   Resp 16   Ht 1.664 m (5' 5.5\")   Wt 75.3 kg (166 lb)   LMP  (LMP Unknown)   SpO2 97%   Breastfeeding No   BMI 27.20 kg/m    GENERAL APPEARANCE: healthy, alert and no distress  EYES: EOMI,  PERRL, conjunctiva clear  HENT: ear canals and TM's normal.  Nose and mouth without ulcers, erythema or lesions  NECK: supple, nontender, no lymphadenopathy  RESP: Positive for bronchospasms and coughing  CV: regular rates and rhythm, normal S1 S2, no murmur noted  ABDOMEN:  soft, nontender, no HSM or masses and bowel sounds normal  NEURO: Normal strength and tone, sensory exam grossly normal,  normal speech and mentation  SKIN: no suspicious lesions or rashes    Chest xray Negative for acute findings, read by Johan Forrest PA-C MMS at time of visit.    Albuterol neb improved symptoms    Results for orders placed or performed in visit on 02/03/20   XR Chest 2 Views     Status: None    Narrative    CHEST TWO VIEWS  2/3/2020 12:12 PM     HISTORY: 29-year-old woman with chills, cough, chest tightness, and  wheezing.    COMPARISON: None.       Impression    IMPRESSION: Heart size is normal. No pleural effusion, pneumothorax,  or abnormal area of consolidation.    TIFFANY HEWITT MD   Results for orders placed or performed in visit on 02/03/20   Influenza A/B antigen     Status: None   Result Value Ref Range    Influenza A/B Agn Specimen Nasal     Influenza A Negative NEG^Negative    Influenza B Negative NEG^Negative       ASSESSMENT/PLAN      ICD-10-CM    1. Chills R68.83 Influenza A/B antigen     XR Chest 2 Views     CANCELED: XR Chest 2 Views   2. Cough R05 Influenza A/B antigen     XR Chest 2 Views     CANCELED: XR Chest 2 Views   3. Chest tightness R07.89 INHALATION/NEBULIZER TREATMENT, INITIAL     albuterol (PROVENTIL) neb " solution 2.5 mg     XR Chest 2 Views     albuterol (PROVENTIL HFA) 108 (90 Base) MCG/ACT inhaler     benzonatate (TESSALON) 200 MG capsule     CANCELED: XR Chest 2 Views   4. Wheezing R06.2 INHALATION/NEBULIZER TREATMENT, INITIAL     albuterol (PROVENTIL) neb solution 2.5 mg     XR Chest 2 Views     albuterol (PROVENTIL HFA) 108 (90 Base) MCG/ACT inhaler     CANCELED: XR Chest 2 Views   5. Productive cough R05 azithromycin (ZITHROMAX) 250 MG tablet   6. Chest congestion R09.89 azithromycin (ZITHROMAX) 250 MG tablet       Orders Placed This Encounter     INHALATION/NEBULIZER TREATMENT, INITIAL     XR Chest 2 Views     albuterol (PROVENTIL) neb solution 2.5 mg     azithromycin (ZITHROMAX) 250 MG tablet     albuterol (PROVENTIL HFA) 108 (90 Base) MCG/ACT inhaler     benzonatate (TESSALON) 200 MG capsule       Patient given information about influenza.  Patient understands they are contagious until their fever has resolved without the use of motrin or tylenol.  At that time they can return to school/work.  Patient is to monitor for any worsening symptoms and return to the clinic if this occurs.  The most common complication of influenza is Pneumonia or other respiratory problems especially in those with underlying lung problems including asthma and COPD.  Patient will follow up if this occurs.    Patient has flu like illness, concern for lung infection with wheezing and productive cough  Follow up with PCP as needed

## 2020-02-20 ENCOUNTER — MYC MEDICAL ADVICE (OUTPATIENT)
Dept: FAMILY MEDICINE | Facility: CLINIC | Age: 30
End: 2020-02-20

## 2020-02-23 ENCOUNTER — HEALTH MAINTENANCE LETTER (OUTPATIENT)
Age: 30
End: 2020-02-23

## 2020-02-24 NOTE — TELEPHONE ENCOUNTER
It can take a few months after stopping OCP for her body to adjust. Please let me know if still no period in the next month or two. Also, if she had irregular periods prior to starting OCP, she may return to the same pattern. If any recent sexual intercourse, I would recommend a pregnancy test of course. Kim Erazo M.D.

## 2020-02-25 ENCOUNTER — OFFICE VISIT (OUTPATIENT)
Dept: FAMILY MEDICINE | Facility: CLINIC | Age: 30
End: 2020-02-25
Payer: COMMERCIAL

## 2020-02-25 VITALS
DIASTOLIC BLOOD PRESSURE: 64 MMHG | SYSTOLIC BLOOD PRESSURE: 94 MMHG | HEIGHT: 66 IN | WEIGHT: 162 LBS | OXYGEN SATURATION: 97 % | HEART RATE: 68 BPM | BODY MASS INDEX: 26.03 KG/M2 | TEMPERATURE: 97.6 F | RESPIRATION RATE: 13 BRPM

## 2020-02-25 DIAGNOSIS — B37.31 CANDIDIASIS OF VAGINA: ICD-10-CM

## 2020-02-25 DIAGNOSIS — Z00.00 ROUTINE GENERAL MEDICAL EXAMINATION AT A HEALTH CARE FACILITY: Primary | ICD-10-CM

## 2020-02-25 DIAGNOSIS — L30.9 ECZEMA, UNSPECIFIED TYPE: ICD-10-CM

## 2020-02-25 DIAGNOSIS — Z30.9 ENCOUNTER FOR CONTRACEPTIVE MANAGEMENT, UNSPECIFIED TYPE: ICD-10-CM

## 2020-02-25 LAB
SPECIMEN SOURCE: ABNORMAL
WET PREP SPEC: ABNORMAL

## 2020-02-25 PROCEDURE — 99395 PREV VISIT EST AGE 18-39: CPT | Performed by: FAMILY MEDICINE

## 2020-02-25 PROCEDURE — G0145 SCR C/V CYTO,THINLAYER,RESCR: HCPCS | Performed by: FAMILY MEDICINE

## 2020-02-25 PROCEDURE — 99213 OFFICE O/P EST LOW 20 MIN: CPT | Mod: 25 | Performed by: FAMILY MEDICINE

## 2020-02-25 PROCEDURE — 87210 SMEAR WET MOUNT SALINE/INK: CPT | Performed by: FAMILY MEDICINE

## 2020-02-25 PROCEDURE — 87624 HPV HI-RISK TYP POOLED RSLT: CPT | Performed by: FAMILY MEDICINE

## 2020-02-25 RX ORDER — TRIAMCINOLONE ACETONIDE 1 MG/G
CREAM TOPICAL
Qty: 30 G | Refills: 0 | Status: SHIPPED | OUTPATIENT
Start: 2020-02-25

## 2020-02-25 RX ORDER — NORETHINDRONE ACETATE AND ETHINYL ESTRADIOL .03; 1.5 MG/1; MG/1
1 TABLET ORAL DAILY
Qty: 84 TABLET | Refills: 3 | Status: SHIPPED | OUTPATIENT
Start: 2020-02-25

## 2020-02-25 RX ORDER — FLUCONAZOLE 150 MG/1
150 TABLET ORAL ONCE
Qty: 1 TABLET | Refills: 0 | Status: SHIPPED | OUTPATIENT
Start: 2020-02-25 | End: 2020-04-27

## 2020-02-25 ASSESSMENT — ENCOUNTER SYMPTOMS
COUGH: 0
ARTHRALGIAS: 0
HEARTBURN: 0
DYSURIA: 0
HEMATURIA: 0
WEAKNESS: 0
CONSTIPATION: 0
PALPITATIONS: 0
DIZZINESS: 0
EYE PAIN: 0
HEADACHES: 0
MYALGIAS: 0
SHORTNESS OF BREATH: 0
SORE THROAT: 0
NAUSEA: 0
FEVER: 0
DIARRHEA: 0
PARESTHESIAS: 0
HEMATOCHEZIA: 0
BREAST MASS: 0
CHILLS: 0
FREQUENCY: 0
ABDOMINAL PAIN: 0
NERVOUS/ANXIOUS: 0
JOINT SWELLING: 0

## 2020-02-25 ASSESSMENT — MIFFLIN-ST. JEOR: SCORE: 1463.64

## 2020-02-25 NOTE — PROGRESS NOTES
SUBJECTIVE:   CC: Jose Ramon Santizo is an 29 year old woman who presents for preventive health visit.     Healthy Habits:     Getting at least 3 servings of Calcium per day:  Yes    Bi-annual eye exam:  NO    Dental care twice a year:  Yes    Sleep apnea or symptoms of sleep apnea:  None    Diet:  Regular (no restrictions)    Frequency of exercise:  6-7 days/week    Duration of exercise:  Greater than 60 minutes    Taking medications regularly:  Yes    Medication side effects:  None    PHQ-2 Total Score: 0    Additional concerns today:  No      Medical assistant advised patient about addressing additional health concerns that could be billed, as it is not considered a part of a preventive physical. Patient verbalized understanding.    Kim Erazo MD, MD on 2020 at 7:38 AM    2.  birth control refills.  She had stopped her birth control pill because she ended a long-term relationship and did not plan on being in a relationship soon.  She is now interested in dating and wanted to go back on the birth control pill.  She stopped the birth control pill in November.  She had a period in December and then has not had one for the past month and a half.  She denies any concern for pregnancy.    Was recently on antibiotics and has noticed some increased vaginal irritation, discharge and itch.       Today's PHQ-2 Score:   PHQ-2 (  [employer]) 2020   Q1: Little interest or pleasure in doing things 0   Q2: Feeling down, depressed or hopeless 0   PHQ-2 Score 0   Q1: Little interest or pleasure in doing things Not at all   Q2: Feeling down, depressed or hopeless Not at all   PHQ-2 Score 0       Abuse: Current or Past(Physical, Sexual or Emotional)- No  Do you feel safe in your environment? Yes        Social History     Tobacco Use     Smoking status: Former Smoker     Packs/day: 0.50     Types: Cigarettes     Start date: 2010     Last attempt to quit: 2014     Years since quittin.4     Smokeless  tobacco: Never Used   Substance Use Topics     Alcohol use: Yes     Comment: 6 drinks a week      If you drink alcohol do you typically have >3 drinks per day or >7 drinks per week? No    Alcohol Use 2020   Prescreen: >3 drinks/day or >7 drinks/week? No   Prescreen: >3 drinks/day or >7 drinks/week? -       Reviewed orders with patient.  Reviewed health maintenance and updated orders accordingly - Yes  BP Readings from Last 3 Encounters:   20 94/64   20 112/62   10/29/18 118/68    Wt Readings from Last 3 Encounters:   20 73.5 kg (162 lb)   20 75.3 kg (166 lb)   10/29/18 74 kg (163 lb 4 oz)                  Patient Active Problem List   Diagnosis     History of anesthesia reaction     CARDIOVASCULAR SCREENING; LDL GOAL LESS THAN 160     Past Surgical History:   Procedure Laterality Date     ENT SURGERY       EYE SURGERY  2017    LASIK     ORTHOPEDIC SURGERY  3/2005    ACL repair       Social History     Tobacco Use     Smoking status: Former Smoker     Packs/day: 0.50     Types: Cigarettes     Start date: 2010     Last attempt to quit: 2014     Years since quittin.4     Smokeless tobacco: Never Used   Substance Use Topics     Alcohol use: Yes     Comment: 6 drinks a week      Family History   Problem Relation Age of Onset     Anesthesia Reaction Mother      Hypertension Mother      Hyperlipidemia Mother      Anxiety Disorder Mother      Alcohol/Drug Other      Breast Cancer Other      Cancer Other      Diabetes Other      Thyroid Disease Other      Hypertension Maternal Grandmother      Hyperlipidemia Maternal Grandmother      Other Cancer Maternal Grandmother         Ear cancer     Hypertension Maternal Grandfather      Hyperlipidemia Maternal Grandfather      Hypertension Paternal Grandfather      Other Cancer Paternal Grandmother         Ovarian cancer         Current Outpatient Medications   Medication Sig Dispense Refill     triamcinolone (KENALOG) 0.1 % cream  Apply sparingly to affected area three times daily for 14 days. 30 g 0     albuterol (PROVENTIL HFA) 108 (90 Base) MCG/ACT inhaler Inhale 2 puffs into the lungs every 6 hours (Patient not taking: Reported on 2020) 1 Inhaler 0     hydrOXYzine (ATARAX) 25 MG tablet Take 0.5-1 tablets (12.5-25 mg) by mouth every 6 hours as needed for itching or anxiety (Patient not taking: Reported on 2020) 30 tablet 1     MICROGESTIN 1.5-30 MG-MCG tablet TAKE 1 TABLET BY MOUTH EVERY DAY (Patient not taking: Reported on 2/3/2020) 84 tablet 0     Allergies   Allergen Reactions     Ciprofloxacin      Recent Labs   Lab Test 10/29/18  1048 14  1238 13   LDL 98 98  --    HDL 70 60  --    TRIG 87 138  --    TSH  --   --  0.74        Mammogram not appropriate for this patient based on age.    Pertinent mammograms are reviewed under the imaging tab.  History of abnormal Pap smear:   Last 3 Pap and HPV Results:   PAP / HPV 10/17/2016   PAP NIL     PAP / HPV 10/17/2016   PAP NIL     Reviewed and updated as needed this visit by clinical staff  Tobacco  Allergies  Meds  Med Hx  Surg Hx  Fam Hx  Soc Hx        Reviewed and updated as needed this visit by Provider        Past Medical History:   Diagnosis Date     History of anesthesia reaction       Past Surgical History:   Procedure Laterality Date     ENT SURGERY       EYE SURGERY  2017    LASIK     ORTHOPEDIC SURGERY  3/2005    ACL repair     OB History    Para Term  AB Living   0 0 0 0 0 0   SAB TAB Ectopic Multiple Live Births   0 0 0 0 0       Review of Systems   Constitutional: Negative for chills and fever.   HENT: Negative for congestion, ear pain, hearing loss and sore throat.    Eyes: Negative for pain and visual disturbance.   Respiratory: Negative for cough and shortness of breath.    Cardiovascular: Negative for chest pain, palpitations and peripheral edema.   Gastrointestinal: Negative for abdominal pain, constipation, diarrhea,  "heartburn, hematochezia and nausea.   Breasts:  Negative for tenderness, breast mass and discharge.   Genitourinary: Negative for dysuria, frequency, genital sores, hematuria, pelvic pain, urgency, vaginal bleeding and vaginal discharge.   Musculoskeletal: Negative for arthralgias, joint swelling and myalgias.   Skin: Negative for rash.   Neurological: Negative for dizziness, weakness, headaches and paresthesias.   Psychiatric/Behavioral: Negative for mood changes. The patient is not nervous/anxious.            OBJECTIVE:   BP 94/64 (BP Location: Left arm, Patient Position: Chair, Cuff Size: Adult Regular)   Pulse 68   Temp 97.6  F (36.4  C) (Tympanic)   Resp 13   Ht 1.664 m (5' 5.5\")   Wt 73.5 kg (162 lb)   LMP 12/02/2019 (Exact Date)   SpO2 97%   BMI 26.55 kg/m    Physical Exam  GENERAL: healthy, alert and no distress  EYES: Eyes grossly normal to inspection, PERRL and conjunctivae and sclerae normal  HENT: ear canals and TM's normal, nose and mouth without ulcers or lesions  NECK: no adenopathy, no asymmetry, masses, or scars and thyroid normal to palpation  RESP: lungs clear to auscultation - no rales, rhonchi or wheezes  BREAST: normal without masses, tenderness or nipple discharge and no palpable axillary masses or adenopathy  CV: regular rate and rhythm, normal S1 S2, no S3 or S4, no murmur, click or rub, no peripheral edema and peripheral pulses strong  ABDOMEN: soft, nontender, no hepatosplenomegaly, no masses and bowel sounds normal   (female): normal female external genitalia, normal urethral meatus, vaginal mucosa pink, moist, well rugated, there is a thick, lumpy white discharge present and normal cervix   MS: no gross musculoskeletal defects noted, no edema  SKIN: no suspicious lesions or rashes  NEURO: Normal strength and tone, mentation intact and speech normal  PSYCH: mentation appears normal, affect normal/bright    Diagnostic Test Results:  Labs reviewed in Epic  Results for orders " "placed or performed in visit on 02/25/20   Wet prep     Status: Abnormal   Result Value Ref Range    Specimen Description Vagina     Wet Prep No Trichomonas seen     Wet Prep Moderate  Clue cells seen   (A)     Wet Prep Few  Yeast seen   (A)     Wet Prep Few  WBC'S seen           ASSESSMENT/PLAN:      1. Encounter for routine adult health examination without abnormal findings   pap with HPV cotesting completed today.      2. Encounter for contraceptive management, unspecified type   OCP refilled.    - norethindrone-ethinyl estradiol (MICROGESTIN) 1.5-30 MG-MCG per tablet; Take 1 tablet by mouth daily  Dispense: 84 tablet; Refill: 3     3. Eczema, unspecified type  Typically once monthly flare in the wintertime.  She continues to moisturize well especially during the wintertime.  - triamcinolone (KENALOG) 0.1 % cream; Apply sparingly to affected area three times daily for 14 days.  Dispense: 30 g; Refill: 0     4. Candidiasis of vagina   wet prep sent. Recent antibiotic use. Diflucan sent to pharmacy.  - fluconazole (DIFLUCAN) 150 MG tablet; Take 1 tablet (150 mg) by mouth once for 1 dose  Dispense: 1 tablet; Refill: 0  - Wet prep   COUNSELING:  Reviewed preventive health counseling, as reflected in patient instructions  Encouraged condom use  Restarting OCP  Estimated body mass index is 26.55 kg/m  as calculated from the following:    Height as of this encounter: 1.664 m (5' 5.5\").    Weight as of this encounter: 73.5 kg (162 lb).    Weight management plan: Discussed healthy diet and exercise guidelines     reports that she quit smoking about 5 years ago. Her smoking use included cigarettes. She started smoking about 10 years ago. She smoked 0.50 packs per day. She has never used smokeless tobacco.      Counseling Resources:  ATP IV Guidelines  Pooled Cohorts Equation Calculator  Breast Cancer Risk Calculator  FRAX Risk Assessment  ICSI Preventive Guidelines  Dietary Guidelines for Americans, 2010  USDA's " MyPlate  ASA Prophylaxis  Lung CA Screening    Kim Erazo MD   [facility]

## 2020-02-27 LAB
COPATH REPORT: NORMAL
PAP: NORMAL

## 2020-03-01 LAB
FINAL DIAGNOSIS: NORMAL
HPV HR 12 DNA CVX QL NAA+PROBE: NEGATIVE
HPV16 DNA SPEC QL NAA+PROBE: NEGATIVE
HPV18 DNA SPEC QL NAA+PROBE: NEGATIVE
SPECIMEN DESCRIPTION: NORMAL
SPECIMEN SOURCE CVX/VAG CYTO: NORMAL

## 2020-04-15 ENCOUNTER — MYC MEDICAL ADVICE (OUTPATIENT)
Dept: FAMILY MEDICINE | Facility: CLINIC | Age: 30
End: 2020-04-15

## 2020-04-15 NOTE — TELEPHONE ENCOUNTER
Routing to [facility].     Please complete attached form and ask Dr. Erazo to sign form when in clinic on 4/16/20.    Thank you!  EDGAR Forde, BSN, RN

## 2020-04-21 ENCOUNTER — VIRTUAL VISIT (OUTPATIENT)
Dept: FAMILY MEDICINE | Facility: CLINIC | Age: 30
End: 2020-04-21
Payer: COMMERCIAL

## 2020-04-21 VITALS — WEIGHT: 172 LBS | HEIGHT: 66 IN | BODY MASS INDEX: 27.64 KG/M2

## 2020-04-21 DIAGNOSIS — R63.5 ABNORMAL WEIGHT GAIN: Primary | ICD-10-CM

## 2020-04-21 DIAGNOSIS — R63.5 ABNORMAL WEIGHT GAIN: ICD-10-CM

## 2020-04-21 LAB — TSH SERPL DL<=0.005 MIU/L-ACNC: 0.68 MU/L (ref 0.4–4)

## 2020-04-21 PROCEDURE — 36415 COLL VENOUS BLD VENIPUNCTURE: CPT | Performed by: FAMILY MEDICINE

## 2020-04-21 PROCEDURE — 99213 OFFICE O/P EST LOW 20 MIN: CPT | Mod: 95 | Performed by: FAMILY MEDICINE

## 2020-04-21 PROCEDURE — 84443 ASSAY THYROID STIM HORMONE: CPT | Performed by: FAMILY MEDICINE

## 2020-04-21 ASSESSMENT — MIFFLIN-ST. JEOR: SCORE: 1509

## 2020-04-21 NOTE — PROGRESS NOTES
"Jose Ramon Santizo is a 29 year old female who is being evaluated via a billable video visit.      The patient has been notified of following:     \"This video visit will be conducted via a call between you and your physician/provider. We have found that certain health care needs can be provided without the need for an in-person physical exam.  This service lets us provide the care you need with a video conversation.  If a prescription is necessary we can send it directly to your pharmacy.  If lab work is needed we can place an order for that and you can then stop by our lab to have the test done at a later time.    Video visits are billed at different rates depending on your insurance coverage.  Please reach out to your insurance provider with any questions.    If during the course of the call the physician/provider feels a video visit is not appropriate, you will not be charged for this service.\"    Patient has given verbal consent for Video visit? Yes    How would you like to obtain your AVS? Darby    Patient would like the video invitation sent by: Send to e-mail at: [email or web address]    Subjective     Jose Ramon Santizo is a 29 year old female who presents to clinic today for the following health issues:    Hypothyroidism concern   Patient notes that she runs 5 miles and bikes to work. Her baseline weight Dec 2019 was 150 and few days ago she was 176 lbs. She was initially attributing weight gain to stress. Since COVAZAEL macdonaldemic she has noticed more mood changes and sluggishness.     patient describes the following symptoms: , dry skin      How many servings of fruits and vegetables do you eat daily?  4 or more    On average, how many sweetened beverages do you drink each day (Examples: soda, juice, sweet tea, etc.  Do NOT count diet or artificially sweetened beverages)?   0    How many days per week do you exercise enough to make your heart beat faster? 7    How many minutes a day do you exercise enough to make your " "heart beat faster? 60 or more    How many days per week do you miss taking your medication? 0        Video Start Time: 9:24 am         Reviewed and updated as needed this visit by Provider         Review of Systems   ROS COMP: Constitutional, HEENT, cardiovascular, pulmonary, gi and gu systems are negative, except as otherwise noted.      Objective    There were no vitals taken for this visit.  Estimated body mass index is 26.55 kg/m  as calculated from the following:    Height as of 2/25/20: 1.664 m (5' 5.5\").    Weight as of 2/25/20: 73.5 kg (162 lb).  Physical Exam   GENERAL: healthy, alert and no distress  EYES: Eyes grossly normal to inspection  HENT: nose and mouth without ulcers or lesions  RESP: non labored   PSYCH: mentation appears normal, affect philip        Diagnostic Test Results:  Labs reviewed in Epic        Assessment & Plan     1. Abnormal weight gain  - symptoms concerning for hypothyroid, will obtain below lab and tx as indicated  - if labs are normal then pt would need further evaluation  - pt agreeable with plan   - TSH with free T4 reflex; Future    Jairo Rodriguez MD  [facility]      Video-Visit Details    Type of service:  Video Visit    Video End Time (time video stopped): 9:30 am     Originating Location (pt. Location): Home    Distant Location (provider location):  [facility]     Mode of Communication:  Video Conference via [employer]    No follow-ups on file.       Jairo Rodriguez MD      "

## 2020-04-27 ENCOUNTER — VIRTUAL VISIT (OUTPATIENT)
Dept: ENDOCRINOLOGY | Facility: CLINIC | Age: 30
End: 2020-04-27
Payer: COMMERCIAL

## 2020-04-27 VITALS — BODY MASS INDEX: 30.16 KG/M2 | HEIGHT: 65 IN | WEIGHT: 181 LBS

## 2020-04-27 DIAGNOSIS — R63.5 WEIGHT GAIN: Primary | ICD-10-CM

## 2020-04-27 DIAGNOSIS — N92.6 IRREGULAR MENSES: ICD-10-CM

## 2020-04-27 PROCEDURE — 99243 OFF/OP CNSLTJ NEW/EST LOW 30: CPT | Mod: GT | Performed by: INTERNAL MEDICINE

## 2020-04-27 ASSESSMENT — MIFFLIN-ST. JEOR: SCORE: 1541.89

## 2020-04-27 NOTE — Clinical Note
Low suspicion for an underlying endocrine problem.  Suspect weight gain from the birth control medication previously prescribed.  TL

## 2020-04-27 NOTE — PROGRESS NOTES
"Jose Ramon Santizo is a 29 year old female who is being evaluated via a billable video visit.      The patient has been notified of following:     \"This video visit will be conducted via a call between you and your physician/provider. We have found that certain health care needs can be provided without the need for an in-person physical exam.  This service lets us provide the care you need with a video conversation.  If a prescription is necessary we can send it directly to your pharmacy.  If lab work is needed we can place an order for that and you can then stop by our lab to have the test done at a later time.    Video visits are billed at different rates depending on your insurance coverage.  Please reach out to your insurance provider with any questions.    If during the course of the call the physician/provider feels a video visit is not appropriate, you will not be charged for this service.\"    Patient has given verbal consent for Video visit? Yes    How would you like to obtain your AVS? Reviewed verbally    Patient would like the video invitation sent by: Text to cell phone: [phone]    Will anyone else be joining your video visit? no        Name: Jose Ramon Santizo is a 29 year old woman, seen at the request of Dr. Jairo Rodriguez for evaluation of     Chief Complaint:  Weight gain    HPI:  Recent issues:  Here for evaluation of weight gain  Reviewed medical history from patient and Epic chart record        Native of [place]  Recalls diagnosis of malignant hyperthermia age 2, when ear tube placement in [place]   Reaction while under general anesthesia, developed significant body warmth   Transported to [facility]   Subsequent lab testing showed her mother was a \"carrier\"   No subsequent hyperthermia episodes  Menarch age 11, regular menstrual cycling  Body weight at high school graduation ~140-145#  College at [employer] and received a business degree, 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 Body wt ~155#  Fall 2019. Body wt decline to 145#, then 1/2020 body wt 155#    Recent weight changes:  2/2020. Body wt 158#  Since late 2/2020. Body wt rise 20-25#  Exercises:  Regularly 1-2 hrs daily  Following usual diet plan, also eats salads for lunch    ~12/2019-2/2020. No menses.  2/2020. Started OCP medication as microgestin 1.5-30 as 1 tab daily.  Had menstrual cycle late 3/2020    Recent [facility] labs include:  Lab Results   Component Value Date    GLC 83 10/29/2018    CHOL 185 10/29/2018    TRIG 87 10/29/2018    HDL 70 10/29/2018    LDL 98 10/29/2018    NHDL 115 10/29/2018     Lab Results   Component Value Date    TSH 0.68 04/21/2020      Recent symptoms:   Weight gain   Some acne at forehead, chin   Hand rash   Denies facial hair   No GI symptoms, arthralgias  Other chronic illnesses include:   Malignant hyperthermia:  Followed by PCP   Hand dermatitis:    Followed by PCP      Single, lives in [facility] [place]  Sees Bobo Erazo and [facility].    PMH/PSH:  Past Medical History:   Diagnosis Date     Dermatitis     hands- eczema?     History of anesthesia reaction 1992    malignant hyperthermia     Past Surgical History:   Procedure Laterality Date     ENT SURGERY       EYE SURGERY  January 2017    LASIK     ORTHOPEDIC SURGERY  3/2005    ACL repair       Family Hx:  Family History   Problem Relation Age of Onset     Anesthesia Reaction Mother      Hypertension Mother      Hyperlipidemia Mother      Anxiety Disorder Mother      Alcohol/Drug Other      Breast Cancer Other       Cancer Other      Diabetes Other      Thyroid Disease Other      Hypertension Maternal Grandmother      Hyperlipidemia Maternal Grandmother      Other Cancer Maternal Grandmother         Ear cancer     Hypertension Maternal Grandfather      Hyperlipidemia Maternal Grandfather      Hypertension Paternal Grandfather      Other Cancer Paternal Grandmother         Ovarian cancer         Social Hx:  Social History     Socioeconomic History     Marital status: Single     Spouse name: Not on file     Number of children: Not on file     Years of education: Not on file     Highest education level: Not on file   Occupational History     Not on file   Social Needs     Financial resource strain: Not on file     Food insecurity     Worry: Not on file     Inability: Not on file     Transportation needs     Medical: Not on file     Non-medical: Not on file   Tobacco Use     Smoking status: Former Smoker     Packs/day: 0.50     Years: 0.00     Pack years: 0.00     Types: Cigarettes     Start date: 2010     Last attempt to quit: 2014     Years since quittin.5     Smokeless tobacco: Never Used   Substance and Sexual Activity     Alcohol use: Yes     Comment: 6 drinks a week      Drug use: No     Sexual activity: Not Currently     Partners: Male     Birth control/protection: Pill   Lifestyle     Physical activity     Days per week: Not on file     Minutes per session: Not on file     Stress: Not on file   Relationships     Social connections     Talks on phone: Not on file     Gets together: Not on file     Attends Religion service: Not on file     Active member of club or organization: Not on file     Attends meetings of clubs or organizations: Not on file     Relationship status: Not on file     Intimate partner violence     Fear of current or ex partner: Not on file     Emotionally abused: Not on file     Physically abused: Not on file     Forced sexual activity: Not on file   Other Topics Concern     Parent/sibling  w/ CABG, MI or angioplasty before 65F 55M? No   Social History Narrative     Not on file          MEDICATIONS:  has a current medication list which includes the following prescription(s): norethindrone-ethinyl estradiol and triamcinolone.    ROS:     ROS: 10 point ROS neg other than the symptoms noted above in the HPI.    GENERAL: energy good, weight gain as noted; denies fevers, chills, night sweats.    HEENT: no dysphagia, odonophagia, diplopia, neck pain  THYROID:  no apparent hyper or hypothyroid symptoms  CV: no chest pain, pressure, palpitations  LUNGS: no SOB, PEREA, cough, wheezing   ABDOMEN: no diarrhea, constipation, abdominal pain  EXTREMITIES: no rashes, ulcers, edema  NEUROLOGY: no headaches, denies changes in vision, tingling, extremitiy numbness   MSK: no muscle aches or pains, weakness  SKIN: mild rash- hands; no other rashes or lesions  : irreg menses as noted  PSYCH:  stable mood, no significant anxiety or depression  ENDOCRINE: no heat or cold intolerance    Physical Exam (visual exam)  VS:  no vital signs taken for video visit  GENERAL: healthy, alert and NAD, well dressed, answering questions appropriately  EYES: eyes grossly normal to inspection, conjunctivae and sclerae normal, no exophthalmos or proptosis  THYROID:  no apparent nodules or goiter  LUNGS: no audible wheeze, cough or visible cyanosis, no visible retractions or increased work of breathing  ABDOMEN: unable to assess  EXTREMITIES: no hand tremors, limited exam  NEUROLOGY: CN grossly intact, mentation intact and speech normal   PSYCH: mentation appears normal, affect normal/bright, judgement and insight intact, normal speech and appearance well groomed      LABS:    All pertinent notes, labs, and images personally reviewed by me.     A/P:  Encounter Diagnoses   Name Primary?     Weight gain Yes     Irregular menses        Comments:   Reviewed health history and her weight concerns.  No indication for edema or an underlying endocrine  problem  Low suspicion for PCOS  Suspect she is having weight gain as a side effect of her OCP medication, since both started ~2/2020    Plan:  Reviewed potential causes of weight gain  Discussed factors with calorie intake, exercise/activity, and possible medication side effects  Reviewed some of the metabolic causes for weight gain    Recommend:  Advise stopping the microgestin OCP medication  Cautioned patient to use another form of birth control if sexually active, without OCP med use  Keep focus on diet, exercise, weight management.  No labs ordered at this time  Monitor for symptom changes, menstrual cycling  Check body weight periodically  F/u evaluation in early 6/2020     Keep regular followup evaluations with PCP's  Addressed patient questions today      Labs ordered today: No orders of the defined types were placed in this encounter.    Radiology/Consults ordered today: None    More than 50% of the time spent with Ms. Santizo on counseling / coordinating her care.  Total appointment time was 30 minutes.    Follow-up:  6/2/20 at 1pm, VV    BOLIVAR Rosario MD, MS  Endocrinology  [facility]    CC: GERMAINE Rodriguez and ORTEGA Erazo      Video-Visit Details    Type of service:  Video Visit    Video Start Time: 2:00 pm  Video End Time: 2:30 pm    Originating Location (pt. Location): home    Distant Location (provider location):  [facility]     Mode of Communication:  [facility] Conference [facility]

## 2020-10-14 ENCOUNTER — OFFICE VISIT (OUTPATIENT)
Dept: MIDWIFE SERVICES | Facility: CLINIC | Age: 30
End: 2020-10-14
Payer: COMMERCIAL

## 2020-10-14 VITALS
WEIGHT: 168.5 LBS | BODY MASS INDEX: 28.07 KG/M2 | SYSTOLIC BLOOD PRESSURE: 118 MMHG | DIASTOLIC BLOOD PRESSURE: 78 MMHG | HEIGHT: 65 IN | HEART RATE: 53 BPM

## 2020-10-14 DIAGNOSIS — Z30.430 ENCOUNTER FOR IUD INSERTION: Primary | ICD-10-CM

## 2020-10-14 DIAGNOSIS — Z11.3 SCREEN FOR STD (SEXUALLY TRANSMITTED DISEASE): ICD-10-CM

## 2020-10-14 LAB — HCG UR QL: NEGATIVE

## 2020-10-14 PROCEDURE — 87491 CHLMYD TRACH DNA AMP PROBE: CPT | Performed by: ADVANCED PRACTICE MIDWIFE

## 2020-10-14 PROCEDURE — 87591 N.GONORRHOEAE DNA AMP PROB: CPT | Performed by: ADVANCED PRACTICE MIDWIFE

## 2020-10-14 PROCEDURE — 58300 INSERT INTRAUTERINE DEVICE: CPT | Performed by: ADVANCED PRACTICE MIDWIFE

## 2020-10-14 PROCEDURE — 81025 URINE PREGNANCY TEST: CPT | Performed by: ADVANCED PRACTICE MIDWIFE

## 2020-10-14 RX ORDER — COPPER 313.4 MG/1
1 INTRAUTERINE DEVICE INTRAUTERINE ONCE
COMMUNITY

## 2020-10-14 RX ORDER — COPPER 313.4 MG/1
1 INTRAUTERINE DEVICE INTRAUTERINE ONCE
Status: ACTIVE
Start: 2020-10-14

## 2020-10-14 ASSESSMENT — MIFFLIN-ST. JEOR: SCORE: 1480.19

## 2020-10-14 ASSESSMENT — PAIN SCALES - GENERAL: PAINLEVEL: NO PAIN (0)

## 2020-10-14 NOTE — PROGRESS NOTES
"Chief Complaint   Patient presents with     IUD       Initial There were no vitals taken for this visit. Estimated body mass index is 30.12 kg/m  as calculated from the following:    Height as of 20: 1.651 m (5' 5\").    Weight as of 20: 82.1 kg (181 lb).  BP completed using cuff size: regular    Questioned patient about current smoking habits.  Pt. currently smokes. Ecig          The following HM Due: NONE      The following patient reported/Care Every where data was sent to:  P ABSTRACT QUALITY INITIATIVES [70391]  n/a      patient has appointment for today            '  "

## 2020-10-14 NOTE — PATIENT INSTRUCTIONS

## 2020-10-14 NOTE — PROGRESS NOTES
"  IUD Insertion:  CONSULT:    Is a pregnancy test required: Yes.  Was it positive or negative?  Negative  Was a consent obtained?  Yes    Subjective: Jose Ramon Santizo is a 30 year old  presents for IUD and desires Paragard type IUD. She has been on pills in the past, went off of them after her last relationship ended, and was not happy with them when she tried to restart. Desires non-hormonal contraception.    Patient has been given the opportunity to ask questions about all forms of birth control, including all options appropriate for Jose Ramon Santizo. Discussed that no method of birth control, except abstinence is 100% effective against pregnancy or sexually transmitted infection.     Jose Ramon Santizo understands she may have the IUD removed at any time. IUD should be removed by a health care provider.    The entire insertion procedure was reviewed with the patient, including care after placement.    LMP 10/7/2020 Last sexual activity: 10/11/2020 with a condom. No allergy to betadine or shellfish. Patient desires STD screening  No results found for: HCG      /78 (BP Location: Left arm, Patient Position: Sitting, Cuff Size: Adult Large)   Pulse 53   Ht 1.651 m (5' 5\")   Wt 76.4 kg (168 lb 8 oz)   Breastfeeding No   BMI 28.04 kg/m      Pelvic Exam:   EG/BUS: normal genital architecture without lesions, erythema or abnormal secretions.   Vagina: moist, pink, rugae with physiologic discharge and secretions  Cervix: nulliparous no lesions and pink, moist, closed, without lesion or CMT  Uterus: retroverted position, mobile, no pain  Adnexa: within normal limits and no masses, nodularity, tenderness    PROCEDURE NOTE: -- IUD Insertion    Reason for Insertion: contraception    Premedicated with ibuprofen.  Under sterile technique, cervix was visualized with speculum and prepped with Betadine solution swab x 3. Tenaculum was placed for stability. The uterus was gently straightened and sounded to 6.5 cm. IUD " prepared for placement, and IUD inserted according to 's instructions without difficulty or significant resitance, and deployed at the fundus. The strings were visualized and trimmed to 2.0 cm from the external os. Tenaculum was removed and hemostasis noted. Speculum removed.  Patient tolerated procedure well.    NDC: [address]-6 LOT: [id number] EXP: JUL 2025                  EBL: minimal    Complications: none    ASSESSMENT:     ICD-10-CM    1. Encounter for IUD insertion  Z30.430 HCG qualitative urine     paragard intrauterine copper device     paragard intrauterine copper IUD device 1 each     INSERTION INTRAUTERINE DEVICE        PLAN:    Given 's handouts, including when to have IUD removed, list of danger s/sx, side effects and follow up recommended. Encouraged condom use for prevention of STD. Back up contraception advised for 7 days if progestin method. Advised to call for any fever, for prolonged or severe pain or bleeding, abnormal vaginal discharge, or unable to palpate strings. She was advised to use pain medications (ibuprofen) as needed for mild to moderate pain. Advised to follow-up in clinic in 4-6 weeks for IUD string check if unable to find strings or as directed by provider.     Flex Denson CNM

## 2020-10-16 LAB
C TRACH DNA SPEC QL NAA+PROBE: NEGATIVE
N GONORRHOEA DNA SPEC QL NAA+PROBE: NEGATIVE
SPECIMEN SOURCE: NORMAL
SPECIMEN SOURCE: NORMAL

## 2020-11-10 ENCOUNTER — TRANSFERRED RECORDS (OUTPATIENT)
Dept: HEALTH INFORMATION MANAGEMENT | Facility: CLINIC | Age: 30
End: 2020-11-10

## 2020-11-10 ENCOUNTER — ANCILLARY PROCEDURE (OUTPATIENT)
Dept: ULTRASOUND IMAGING | Facility: CLINIC | Age: 30
End: 2020-11-10
Attending: NURSE PRACTITIONER
Payer: COMMERCIAL

## 2020-11-10 ENCOUNTER — MEDICAL CORRESPONDENCE (OUTPATIENT)
Dept: HEALTH INFORMATION MANAGEMENT | Facility: CLINIC | Age: 30
End: 2020-11-10

## 2020-11-10 DIAGNOSIS — R22.2 ABDOMINAL WALL MASS: ICD-10-CM

## 2020-11-10 PROCEDURE — 76705 ECHO EXAM OF ABDOMEN: CPT | Mod: GC | Performed by: RADIOLOGY

## 2020-11-12 NOTE — TELEPHONE ENCOUNTER
REFERRAL INFORMATION:    Referring Provider:  N/A    Referring Clinic:  N/A    Reason for Visit/Diagnosis: Umbilical Hernia        FUTURE VISIT INFORMATION:    Appointment Date: 11/13/2020    Appointment Time: 8 AM      NOTES RECORD STATUS  DETAILS   OFFICE NOTE from Referring Provider N/A    OFFICE NOTE from Other Specialists N/A    HOSPITAL DISCHARGE SUMMARY/ ED VISITS  N/A    OPERATIVE REPORT N/A    ENDOSCOPY (EGD)  N/A    PERTINENT LABS Internal    PATHOLOGY REPORTS (RELATED) N/A    IMAGING (CT, MRI, US, XR)  Internal US Abdomen: 11/10/2020

## 2020-11-13 ENCOUNTER — PRE VISIT (OUTPATIENT)
Dept: SURGERY | Facility: CLINIC | Age: 30
End: 2020-11-13

## 2020-11-13 ENCOUNTER — OFFICE VISIT (OUTPATIENT)
Dept: SURGERY | Facility: CLINIC | Age: 30
End: 2020-11-13
Payer: COMMERCIAL

## 2020-11-13 VITALS
HEART RATE: 61 BPM | WEIGHT: 170.4 LBS | OXYGEN SATURATION: 99 % | DIASTOLIC BLOOD PRESSURE: 84 MMHG | SYSTOLIC BLOOD PRESSURE: 132 MMHG | BODY MASS INDEX: 28.39 KG/M2 | HEIGHT: 65 IN

## 2020-11-13 DIAGNOSIS — K43.9 VENTRAL HERNIA WITHOUT OBSTRUCTION OR GANGRENE: Primary | ICD-10-CM

## 2020-11-13 PROCEDURE — 99203 OFFICE O/P NEW LOW 30 MIN: CPT | Performed by: SURGERY

## 2020-11-13 RX ORDER — ONDANSETRON 4 MG/1
TABLET, FILM COATED ORAL
COMMUNITY
Start: 2020-11-10

## 2020-11-13 ASSESSMENT — PAIN SCALES - GENERAL: PAINLEVEL: MODERATE PAIN (4)

## 2020-11-13 ASSESSMENT — MIFFLIN-ST. JEOR: SCORE: 1488.81

## 2020-11-13 NOTE — NURSING NOTE
"Chief Complaint   Patient presents with     Consult     New hernia appointment.       Vitals:    11/13/20 0750   BP: 132/84   BP Location: Left arm   Patient Position: Sitting   Cuff Size: Adult Regular   Pulse: 61   SpO2: 99%   Weight: 77.3 kg (170 lb 6.4 oz)   Height: 1.651 m (5' 5\")       Body mass index is 28.36 kg/m .                            OMAR HARDY, EMT    "

## 2020-11-13 NOTE — LETTER
11/13/2020       RE: Jose Ramon Santizo  [address]  [address]  [facility] [address]     Dear Colleague,    Thank you for referring your patient, Jose Ramon Santizo, to the [facility] [place] at [facility]. Please see a copy of my visit note below.        New Hernia Consultation Note      Jose Ramon Santizo  [id number]  1990    Requesting Provider: Referred Self    Dear Kim Erazo,    I was asked by Referred Self to see this patient for the following problem:    CHIEF COMPLAINT:  Painful bulge midline, 4cm above umbilicus    HISTORY OF PRESENT ILLNESS:  Location: upper ventral wall  Severity: Mild     Primary hernia, as seen by US      NUTRITIONAL STATUS:      Body mass index is 28.36 kg/m .    Patient is not immunosuppressed.    Patient is a current smoker (VAPE)    Past Medical History:   Diagnosis Date     Dermatitis     hands- eczema?     History of anesthesia reaction 1992    malignant hyperthermia       Patient Active Problem List   Diagnosis     History of anesthesia reaction     CARDIOVASCULAR SCREENING; LDL GOAL LESS THAN 160     IUD (intrauterine device) in place       Past Surgical History:   Procedure Laterality Date     ENT SURGERY       EYE SURGERY  January 2017    LASIK     ORTHOPEDIC SURGERY  3/2005    ACL repair       MEDICATIONS:  Current Outpatient Medications   Medication     omeprazole (PRILOSEC) 20 MG DR capsule     ondansetron (ZOFRAN) 4 MG tablet     paragard intrauterine copper device     norethindrone-ethinyl estradiol (MICROGESTIN) 1.5-30 MG-MCG tablet     triamcinolone (KENALOG) 0.1 % external cream     Current Facility-Administered Medications   Medication     paragard intrauterine copper IUD device 1 each       ALLERGIES:  Allergies   Allergen Reactions     No Clinical Screening - See Comments      Malignant hypothermia from general anesthesia     Ciprofloxacin        Social History     Socioeconomic History      Marital status: Single     Spouse name: None     Number of children: None     Years of education: None     Highest education level: None   Occupational History     None   Social Needs     Financial resource strain: None     Food insecurity     Worry: None     Inability: None     Transportation needs     Medical: None     Non-medical: None   Tobacco Use     Smoking status: Former Smoker     Packs/day: 0.50     Years: 0.00     Pack years: 0.00     Types: Cigarettes     Start date: 2010     Quit date: 2014     Years since quittin.1     Smokeless tobacco: Never Used     Tobacco comment: Still using e-cig   Substance and Sexual Activity     Alcohol use: Yes     Comment: 6 drinks a week      Drug use: No     Sexual activity: Yes     Partners: Male   Lifestyle     Physical activity     Days per week: None     Minutes per session: None     Stress: None   Relationships     Social connections     Talks on phone: None     Gets together: None     Attends Buddhist service: None     Active member of club or organization: None     Attends meetings of clubs or organizations: None     Relationship status: None     Intimate partner violence     Fear of current or ex partner: None     Emotionally abused: None     Physically abused: None     Forced sexual activity: None   Other Topics Concern     Parent/sibling w/ CABG, MI or angioplasty before 65F 55M? No   Social History Narrative     None       Family History   Problem Relation Age of Onset     Anesthesia Reaction Mother      Hypertension Mother      Hyperlipidemia Mother      Anxiety Disorder Mother      Depression Mother      Alcohol/Drug Other      Breast Cancer Other      Cancer Other      Diabetes Other      Thyroid Disease Other      Hypertension Maternal Grandmother      Hyperlipidemia Maternal Grandmother      Other Cancer Maternal Grandmother         Ear cancer     Hypertension Maternal Grandfather      Hyperlipidemia Maternal Grandfather      Hypertension  "Paternal Grandfather      Other Cancer Paternal Grandmother         Ovarian cancer       ROS    No orders of the defined types were placed in this encounter.      PHYSICAL EXAMINATION:  Vital Signs: /84 (BP Location: Left arm, Patient Position: Sitting, Cuff Size: Adult Regular)   Pulse 61   Ht 1.651 m (5' 5\")   Wt 77.3 kg (170 lb 6.4 oz)   SpO2 99%   BMI 28.36 kg/m    HEENT: NCAT; MMM;   Lungs: Breathing unlabored  Abdomen: s/ntNd, small primary upper ventral wall reducible hernia,      PHYSICAL EXAM AREA OF INTEREST:  easily reducible.    IMAGING:  CT scan reviewed: n/a (US reviewed)    ASSESSMENT:  ventral  Hernia size is less than 5cm in size.    DISCUSSION OF RISKS:  I discussed the alternatives, benefits, risks and possible complications of hernia repair with the patient. The risks of hernia surgery with and without mesh are described below.    Based on FDA s analysis of medical device adverse event reports and of peer-reviewed, scientific literature, the most common adverse events for all surgical repair of hernias--with or without mesh--are pain, infection, hernia recurrence, scar-like tissue that sticks tissues together (adhesion), blockage of the large or small intestine (obstruction), bleeding, abnormal connection between organs, vessels, or intestines (fistula), fluid build-up at the surgical site (seroma), and a hole in neighboring tissues or organs (perforation).  Some other potential adverse events that can occur following hernia repair with mesh are mesh migration and mesh shrinkage (contraction).    http://www.fda.gov/MedicalDevices/ProductsandMedicalProcedures/ImplantsandProsthetics/HerniaSurgicalMesh/default.htm    We discussed open vs lap approached, and mesh vs no mesh. I explained that in females planning on bearing children I treid to avoid mesh, particularly intraperitoneally, to avoid worsening ab wall compliance and preventing infection. As this defect feels small, and patient is " not obese, we settle on primary lap VHR, to be scheduled at patients convenience    PLAN:  Hernia surgery is indicated   PAC  COVID  PEriop    Sincerely,    Mike Gutierrez MD

## 2020-11-13 NOTE — PROGRESS NOTES
New Hernia Consultation Note      [facility]  [id number]  1990    Requesting Provider: Referred Self    Dear Kim Erazo    I was asked by Referred Self to see this patient for the following problem:    CHIEF COMPLAINT:  Painful bulge midline, 4cm above umbilicus    HISTORY OF PRESENT ILLNESS:  Location: upper ventral wall  Severity: Mild     Primary hernia, as seen by US      NUTRITIONAL STATUS:      Body mass index is 28.36 kg/m .    Patient is not immunosuppressed.    Patient is a current smoker (VAPE)    Past Medical History:   Diagnosis Date     Dermatitis     hands- eczema?     History of anesthesia reaction 1992    malignant hyperthermia       Patient Active Problem List   Diagnosis     History of anesthesia reaction     CARDIOVASCULAR SCREENING; LDL GOAL LESS THAN 160     IUD (intrauterine device) in place       Past Surgical History:   Procedure Laterality Date     ENT SURGERY       EYE SURGERY  January 2017    LASIK     ORTHOPEDIC SURGERY  3/2005    ACL repair       MEDICATIONS:  Current Outpatient Medications   Medication     omeprazole (PRILOSEC) 20 MG DR capsule     ondansetron (ZOFRAN) 4 MG tablet     paragard intrauterine copper device     norethindrone-ethinyl estradiol (MICROGESTIN) 1.5-30 MG-MCG tablet     triamcinolone (KENALOG) 0.1 % external cream     Current Facility-Administered Medications   Medication     paragard intrauterine copper IUD device 1 each       ALLERGIES:  Allergies   Allergen Reactions     No Clinical Screening - See Comments      Malignant hypothermia from general anesthesia     Ciprofloxacin        Social History     Socioeconomic History     Marital status: Single     Spouse name: None     Number of children: None     Years of education: None     Highest education level: None   Occupational History     None   Social Needs     Financial resource strain: None     Food insecurity     Worry: None     Inability: None     Transportation needs     Medical: None  "    Non-medical: None   Tobacco Use     Smoking status: Former Smoker     Packs/day: 0.50     Years: 0.00     Pack years: 0.00     Types: Cigarettes     Start date: 2010     Quit date: 2014     Years since quittin.1     Smokeless tobacco: Never Used     Tobacco comment: Still using e-cig   Substance and Sexual Activity     Alcohol use: Yes     Comment: 6 drinks a week      Drug use: No     Sexual activity: Yes     Partners: Male   Lifestyle     Physical activity     Days per week: None     Minutes per session: None     Stress: None   Relationships     Social connections     Talks on phone: None     Gets together: None     Attends Episcopalian service: None     Active member of club or organization: None     Attends meetings of clubs or organizations: None     Relationship status: None     Intimate partner violence     Fear of current or ex partner: None     Emotionally abused: None     Physically abused: None     Forced sexual activity: None   Other Topics Concern     Parent/sibling w/ CABG, MI or angioplasty before 65F 55M? No   Social History Narrative     None       Family History   Problem Relation Age of Onset     Anesthesia Reaction Mother      Hypertension Mother      Hyperlipidemia Mother      Anxiety Disorder Mother      Depression Mother      Alcohol/Drug Other      Breast Cancer Other      Cancer Other      Diabetes Other      Thyroid Disease Other      Hypertension Maternal Grandmother      Hyperlipidemia Maternal Grandmother      Other Cancer Maternal Grandmother         Ear cancer     Hypertension Maternal Grandfather      Hyperlipidemia Maternal Grandfather      Hypertension Paternal Grandfather      Other Cancer Paternal Grandmother         Ovarian cancer       ROS    No orders of the defined types were placed in this encounter.      PHYSICAL EXAMINATION:  Vital Signs: /84 (BP Location: Left arm, Patient Position: Sitting, Cuff Size: Adult Regular)   Pulse 61   Ht 1.651 m (5' 5\")   " Wt 77.3 kg (170 lb 6.4 oz)   SpO2 99%   BMI 28.36 kg/m    HEENT: NCAT; MMM;   Lungs: Breathing unlabored  Abdomen: s/ntNd, small primary upper ventral wall reducible hernia,      PHYSICAL EXAM AREA OF INTEREST:  easily reducible.    IMAGING:  CT scan reviewed: n/a (US reviewed)    ASSESSMENT:  ventral  Hernia size is less than 5cm in size.    DISCUSSION OF RISKS:  I discussed the alternatives, benefits, risks and possible complications of hernia repair with the patient. The risks of hernia surgery with and without mesh are described below.    Based on FDA s analysis of medical device adverse event reports and of peer-reviewed, scientific literature, the most common adverse events for all surgical repair of hernias--with or without mesh--are pain, infection, hernia recurrence, scar-like tissue that sticks tissues together (adhesion), blockage of the large or small intestine (obstruction), bleeding, abnormal connection between organs, vessels, or intestines (fistula), fluid build-up at the surgical site (seroma), and a hole in neighboring tissues or organs (perforation).  Some other potential adverse events that can occur following hernia repair with mesh are mesh migration and mesh shrinkage (contraction).    http://www.fda.gov/MedicalDevices/ProductsandMedicalProcedures/ImplantsandProsthetics/HerniaSurgicalMesh/default.htm    We discussed open vs lap approached, and mesh vs no mesh. I explained that in females planning on bearing children I treid to avoid mesh, particularly intraperitoneally, to avoid worsening ab wall compliance and preventing infection. As this defect feels small, and patient is not obese, we settle on primary lap VHR, to be scheduled at patients convenience    PLAN:  Hernia surgery is indicated   PAC  COVID  PEriop    Sincerely,    Mike Gutierrez MD

## 2020-12-06 ENCOUNTER — HEALTH MAINTENANCE LETTER (OUTPATIENT)
Age: 30
End: 2020-12-06

## 2020-12-16 ENCOUNTER — MYC MEDICAL ADVICE (OUTPATIENT)
Dept: FAMILY MEDICINE | Facility: CLINIC | Age: 30
End: 2020-12-16

## 2021-02-17 ENCOUNTER — TELEPHONE (OUTPATIENT)
Dept: SURGERY | Facility: CLINIC | Age: 31
End: 2021-02-17

## 2021-02-17 NOTE — TELEPHONE ENCOUNTER
Left VM message inquiring if patient could have her ventral hernia surgery on 3/17 with Dr. Mike Gutierrez at the [facility]. My direct call back number left.

## 2021-03-08 ENCOUNTER — MYC MEDICAL ADVICE (OUTPATIENT)
Dept: FAMILY MEDICINE | Facility: CLINIC | Age: 31
End: 2021-03-08

## 2021-04-11 ENCOUNTER — HEALTH MAINTENANCE LETTER (OUTPATIENT)
Age: 31
End: 2021-04-11

## 2021-06-09 ENCOUNTER — IMMUNIZATION (OUTPATIENT)
Dept: LAB | Facility: CLINIC | Age: 31
End: 2021-06-09
Payer: COMMERCIAL

## 2021-09-25 ENCOUNTER — HEALTH MAINTENANCE LETTER (OUTPATIENT)
Age: 31
End: 2021-09-25

## 2022-02-17 PROBLEM — Z97.5 IUD (INTRAUTERINE DEVICE) IN PLACE: Status: ACTIVE | Noted: 2020-10-14

## 2022-05-07 ENCOUNTER — HEALTH MAINTENANCE LETTER (OUTPATIENT)
Age: 32
End: 2022-05-07

## 2023-01-07 ENCOUNTER — HEALTH MAINTENANCE LETTER (OUTPATIENT)
Age: 33
End: 2023-01-07

## 2023-06-02 ENCOUNTER — HEALTH MAINTENANCE LETTER (OUTPATIENT)
Age: 33
End: 2023-06-02